# Patient Record
Sex: FEMALE | Race: WHITE | Employment: FULL TIME | URBAN - METROPOLITAN AREA
[De-identification: names, ages, dates, MRNs, and addresses within clinical notes are randomized per-mention and may not be internally consistent; named-entity substitution may affect disease eponyms.]

---

## 2020-08-06 RX ORDER — ETHYNODIOL DIACETATE AND ETHINYL ESTRADIOL 1 MG-50MCG
KIT ORAL
Qty: 4 PACKAGE | Refills: 4 | Status: SHIPPED | OUTPATIENT
Start: 2020-08-06 | End: 2021-08-04 | Stop reason: SDUPTHER

## 2021-01-21 ENCOUNTER — VIRTUAL VISIT (OUTPATIENT)
Dept: PRIMARY CARE CLINIC | Age: 46
End: 2021-01-21
Payer: COMMERCIAL

## 2021-01-21 DIAGNOSIS — J01.01 ACUTE RECURRENT MAXILLARY SINUSITIS: Primary | ICD-10-CM

## 2021-01-21 PROCEDURE — 99213 OFFICE O/P EST LOW 20 MIN: CPT | Performed by: NURSE PRACTITIONER

## 2021-01-21 RX ORDER — MONTELUKAST SODIUM 10 MG/1
10 TABLET ORAL DAILY
COMMUNITY
Start: 2020-11-27 | End: 2021-11-30

## 2021-01-21 RX ORDER — AMOXICILLIN AND CLAVULANATE POTASSIUM 875; 125 MG/1; MG/1
1 TABLET, FILM COATED ORAL EVERY 12 HOURS
Qty: 20 TAB | Refills: 0 | Status: SHIPPED | OUTPATIENT
Start: 2021-01-21 | End: 2021-01-31

## 2021-01-21 RX ORDER — CHOLECALCIFEROL (VITAMIN D3) 125 MCG
CAPSULE ORAL
COMMUNITY
End: 2022-08-09 | Stop reason: ALTCHOICE

## 2021-01-21 NOTE — PROGRESS NOTES
HISTORY OF PRESENT ILLNESS  Dinorah Wells is a 39 y.o. female presents via telemedicine for  Sinus infection. Patient reported that she has been having sinus pain and nasal congestion x2 weeks. Patient reported that she has been having green mucus when she blows her nose. Denies sob, fever, chills, SOB or cough. Patient has been using her singular, zyrtec, flonase with mild relief. Patient notes that she has been having sinus infections often this year (4 sinus infections). There were no vitals filed for this visit. There is no problem list on file for this patient. There are no active problems to display for this patient. Current Outpatient Medications   Medication Sig Dispense Refill    cholecalciferol (VITAMIN D3) (5000 Units /125 mcg) capsule cholecalciferol (vitamin D3) 125 mcg (5,000 unit) capsule      montelukast (SINGULAIR) 10 mg tablet Take 10 mg by mouth daily.  amoxicillin-clavulanate (AUGMENTIN) 875-125 mg per tablet Take 1 Tab by mouth every twelve (12) hours for 10 days. 20 Tab 0    Kelnor 1-50 1-50 mg-mcg tab TAKE 1 ACTIVE TABLET BY MOUTH ONCE DAILY, DISCARD PLACEBOS 4 Package 4     No Known Allergies  Past Medical History:   Diagnosis Date    Allergies      Past Surgical History:   Procedure Laterality Date    HX HYSTERECTOMY       Family History   Problem Relation Age of Onset    High Cholesterol Mother     Diabetes Father     Hypertension Father     Thyroid Disease Sister      Social History     Tobacco Use    Smoking status: Never Smoker    Smokeless tobacco: Never Used   Substance Use Topics    Alcohol use: Never     Frequency: Never           Review of Systems   Constitutional: Negative for chills and fever. HENT: Positive for congestion and sinus pain. Negative for ear pain and sore throat. Respiratory: Negative for cough and shortness of breath. Cardiovascular: Negative for chest pain and palpitations. Gastrointestinal: Negative. Musculoskeletal: Negative for myalgias. Neurological: Negative for dizziness and headaches. Endo/Heme/Allergies: Positive for environmental allergies. Physical Exam  Constitutional:       Appearance: Normal appearance. HENT:      Head: Normocephalic. Eyes:      Conjunctiva/sclera: Conjunctivae normal.   Pulmonary:      Effort: Pulmonary effort is normal.   Skin:     General: Skin is warm and dry. Neurological:      Mental Status: She is alert and oriented to person, place, and time. Psychiatric:         Mood and Affect: Mood normal.         Behavior: Behavior normal.           ASSESSMENT and PLAN  Diagnoses and all orders for this visit:    1. Acute recurrent maxillary sinusitis  Comments:  Recurrent sinus infections, this is the 4th since March 2020. Discussed seeing ENT if they continue to be this frequent. Abx sent in. Supportive measures. Orders:  -     amoxicillin-clavulanate (AUGMENTIN) 875-125 mg per tablet; Take 1 Tab by mouth every twelve (12) hours for 10 days. Bartolo Lundborg, who was evaluated through a synchronous (real-time) audio-video encounter, and/or her healthcare decision maker, is aware that it is a billable service, with coverage as determined by her insurance carrier. She provided verbal consent to proceed: Yes, and patient identification was verified. It was conducted pursuant to the emergency declaration under the [address] [place] [address] authority and the [employer] General Act. A caregiver was present when appropriate. Ability to conduct physical exam was limited. I was at home. The patient was  In a car.          Santiago Simmons NP

## 2021-08-04 ENCOUNTER — OFFICE VISIT (OUTPATIENT)
Dept: OBGYN CLINIC | Age: 46
End: 2021-08-04
Payer: COMMERCIAL

## 2021-08-04 VITALS
OXYGEN SATURATION: 95 % | WEIGHT: 251 LBS | SYSTOLIC BLOOD PRESSURE: 149 MMHG | DIASTOLIC BLOOD PRESSURE: 75 MMHG | HEIGHT: 65 IN | HEART RATE: 69 BPM | BODY MASS INDEX: 41.82 KG/M2

## 2021-08-04 DIAGNOSIS — Z01.419 ROUTINE GYNECOLOGICAL EXAMINATION: Primary | ICD-10-CM

## 2021-08-04 PROCEDURE — 99396 PREV VISIT EST AGE 40-64: CPT | Performed by: OBSTETRICS & GYNECOLOGY

## 2021-08-04 RX ORDER — ETHYNODIOL DIACETATE AND ETHINYL ESTRADIOL 1 MG-50MCG
KIT ORAL
Qty: 4 PACKAGE | Refills: 4 | Status: SHIPPED | OUTPATIENT
Start: 2021-08-04 | End: 2022-08-09 | Stop reason: ALTCHOICE

## 2021-08-04 NOTE — PROGRESS NOTES
HISTORY OF PRESENT ILLNESS  Priscilla Granados is a 55 y.o. female who presents today for the following:  Chief Complaint   Patient presents with    Annual Exam   Is a 59-year-old G1, P3 female who presents today for routine annual exam. She is concerned about weight gain on presentation today. She is currently on Kelnor for menopausal symptoms and wishes to continue.     No Known Allergies    Current Outpatient Medications   Medication Sig    ethynodiol-ethinyl estradiol (Kelnor 1-50, 28,) 1-50 mg-mcg tab TAKE 1 ACTIVE TABLET BY MOUTH ONCE DAILY, DISCARD PLACEBOS    cholecalciferol (VITAMIN D3) (5000 Units /125 mcg) capsule cholecalciferol (vitamin D3) 125 mcg (5,000 unit) capsule    montelukast (SINGULAIR) 10 mg tablet Take 10 mg by mouth daily. (Patient not taking: Reported on 2021)     No current facility-administered medications for this visit.        Past Medical History:   Diagnosis Date    Allergies        Past Surgical History:   Procedure Laterality Date    HX HYSTERECTOMY         Family History   Problem Relation Age of Onset    High Cholesterol Mother     Diabetes Father     Hypertension Father     Thyroid Disease Sister        Social History     Socioeconomic History    Marital status: SINGLE     Spouse name: Not on file    Number of children: Not on file    Years of education: Not on file    Highest education level: Not on file   Occupational History    Not on file   Tobacco Use    Smoking status: Never Smoker    Smokeless tobacco: Never Used   Vaping Use    Vaping Use: Never used   Substance and Sexual Activity    Alcohol use: Not Currently    Drug use: Never    Sexual activity: Yes     Partners: Male     Birth control/protection: Surgical   Other Topics Concern    Not on file   Social History Narrative    Not on file     Social Determinants of Health     Financial Resource Strain:     Difficulty of Paying Living Expenses:    Food Insecurity:     Worried About Running Out of Food in the Last Year:    [address] in the Last Year:    Transportation Needs:     Lack of Transportation (Medical):  Lack of Transportation (Non-Medical):    Physical Activity:     Days of Exercise per Week:     Minutes of Exercise per Session:    Stress:     Feeling of Stress :    Social Connections:     Frequency of Communication with Friends and Family:     Frequency of Social Gatherings with Friends and Family:     Attends [employer] Services:     Active Member of Clubs or Organizations:     Attends Club or Organization Meetings:     Marital Status:    Intimate Partner Violence:     Fear of Current or Ex-Partner:     Emotionally Abused:     Physically Abused:     Sexually Abused:            REVIEW OF SYSTEMS     Constitutional: Negative for chills, fever and malaise/fatigue. HENT: Negative for congestion, hearing loss and sore throat. Respiratory: Negative for cough, sputum production, shortness of breath and wheezing. Cardiovascular: Negative for chest pain. Gastrointestinal: Negative for abdominal pain, constipation, diarrhea, nausea and vomiting. Genitourinary: Negative for dysuria, flank pain, hematuria and urgency. Neurological: Negative for dizziness, loss of consciousness, weakness and headaches. Psychiatric/Behavioral: Negative for depression.      PHYSICAL EXAM  BP (!) 149/75 (BP 1 Location: Left upper arm, BP Patient Position: Sitting, BP Cuff Size: Large adult)   Pulse 69   Ht 5' 5\" (1.651 m)   Wt 251 lb (113.9 kg)   SpO2 95%   BMI 41.77 kg/m²      Patient is a well-developed well-nourished female no apparent distress  She is alert and oriented x3  Head is normocephalic atraumatic pupils equal round react light accommodation  Neck is supple without adenopathy or thyromegaly  Heart is with regular rate and rhythm without murmurs rubs or gallops  Lungs are clear to auscultation and percussion bilaterally  Breasts are without masses bilaterally  Abdomen is soft nontender nondistended bowel sounds are present and active  Extremities are without clubbing cyanosis or edema  Pulses are full and symmetric bilaterally  Pelvic  External genitalia within normal limits  Urethra is midline there are no apparent urethral lesions the bladder is within normal limits  Vagina is with normal rugae there is minimal discharge present in the vaginal vault  Cervix is parous, there are no apparent cervical lesions, there is no cervical motion tenderness  Uterus is normal size and contours  Adnexa are without masses    ASSESSMENT and PLAN  Normal annual exam, discussed diet and exercise for weight loss  Plan: Mammogram done, hormone replacement therapy refilled  No results found for this visit on 08/04/21.     Orders Placed This Encounter    ethynodiol-ethinyl estradiol (Kelnor 1-50, 28,) 1-50 mg-mcg tab     Sig: TAKE 1 ACTIVE TABLET BY MOUTH ONCE DAILY, DISCARD PLACEBOS     Dispense:  4 Package     Refill:  4

## 2021-08-04 NOTE — PROGRESS NOTES
Chief Complaint   Patient presents with    Annual Exam     Visit Vitals  BP (!) 149/75 (BP 1 Location: Left upper arm, BP Patient Position: Sitting, BP Cuff Size: Large adult)   Pulse 69   Ht 5' 5\" (1.651 m)   Wt 251 lb (113.9 kg)   SpO2 95%   BMI 41.77 kg/m²

## 2021-11-26 ENCOUNTER — NURSE TRIAGE (OUTPATIENT)
Dept: OTHER | Facility: CLINIC | Age: 46
End: 2021-11-26

## 2021-11-26 NOTE — TELEPHONE ENCOUNTER
Received call from Nat Sequeira at [facility] with Red Flag Complaint. Brief description of triage: dizziness, light headed. AT times room spins. Triage indicates for patient to pcp in 24 hours, [facility] if unable to schedule    Care advice provided, patient verbalizes understanding; denies any other questions or concerns; instructed to call back for any new or worsening symptoms. Writer provided warm transfer to [facility] at [facility] for appointment scheduling. Attention Provider: Thank you for allowing me to participate in the care of your patient. The patient was connected to triage in response to information provided to the [employer]. Please do not respond through this encounter as the response is not directed to a shared pool. Reason for Disposition   [1] MODERATE dizziness (e.g., vertigo; feels very unsteady, interferes with normal activities) AND [2] has NOT been evaluated by physician for this    Answer Assessment - Initial Assessment Questions  1. DESCRIPTION: \"Describe your dizziness. \"      Room spinning    2. VERTIGO: \"Do you feel like either you or the room is spinning or tilting? \"       Yes    3. LIGHTHEADED: \"Do you feel lightheaded? \" (e.g., somewhat faint, woozy, weak upon standing)      Woozy    4. SEVERITY: \"How bad is it? \"  \"Can you walk? \"    - MILD - Feels unsteady but walking normally. - MODERATE - Feels very unsteady when walking, but not falling; interferes with normal activities (e.g., school, work) . - SEVERE - Unable to walk without falling (requires assistance). Moderate    5. ONSET:  \"When did the dizziness begin? \"      Started last week, worse this week    6. AGGRAVATING FACTORS: \"Does anything make it worse? \" (e.g., standing, change in head position)      Movement    7. CAUSE: \"What do you think is causing the dizziness? \"      Unsure    8. RECURRENT SYMPTOM: \"Have you had dizziness before? \" If so, ask: \"When was the last time? \" \"What happened that time? \" Denies    9. OTHER SYMPTOMS: \"Do you have any other symptoms? \" (e.g., headache, weakness, numbness, vomiting, earache)      Nausea,    10. PREGNANCY: \"Is there any chance you are pregnant? \" \"When was your last menstrual period? \"        denies    Protocols used: DIZZINESS - VERTIGO-ADULT-AH

## 2021-11-30 ENCOUNTER — OFFICE VISIT (OUTPATIENT)
Dept: PRIMARY CARE CLINIC | Age: 46
End: 2021-11-30
Payer: COMMERCIAL

## 2021-11-30 VITALS
RESPIRATION RATE: 18 BRPM | HEIGHT: 65 IN | DIASTOLIC BLOOD PRESSURE: 84 MMHG | SYSTOLIC BLOOD PRESSURE: 144 MMHG | BODY MASS INDEX: 38.82 KG/M2 | TEMPERATURE: 97.9 F | WEIGHT: 233 LBS | HEART RATE: 77 BPM | OXYGEN SATURATION: 96 %

## 2021-11-30 DIAGNOSIS — Z23 ENCOUNTER FOR IMMUNIZATION: ICD-10-CM

## 2021-11-30 DIAGNOSIS — R42 VERTIGO: Primary | ICD-10-CM

## 2021-11-30 DIAGNOSIS — R03.0 ELEVATED BP WITHOUT DIAGNOSIS OF HYPERTENSION: ICD-10-CM

## 2021-11-30 DIAGNOSIS — E78.2 MIXED HYPERLIPIDEMIA: ICD-10-CM

## 2021-11-30 PROCEDURE — 90471 IMMUNIZATION ADMIN: CPT | Performed by: NURSE PRACTITIONER

## 2021-11-30 PROCEDURE — 99214 OFFICE O/P EST MOD 30 MIN: CPT | Performed by: NURSE PRACTITIONER

## 2021-11-30 PROCEDURE — 90756 CCIIV4 VACC ABX FREE IM: CPT | Performed by: FAMILY MEDICINE

## 2021-11-30 RX ORDER — MECLIZINE HYDROCHLORIDE 25 MG/1
25 TABLET ORAL
Qty: 30 TABLET | Refills: 0
Start: 2021-11-30 | End: 2021-12-10

## 2021-11-30 NOTE — LETTER
NOTIFICATION RETURN TO WORK / SCHOOL    11/30/2021 10:07 AM    Ms. Valerio Chappell  [facility] [address]      To Whom It May Concern: Valerio Chappell is currently under the care of Morenita Cherry. She will return to work/school on:12/6/21    If there are questions or concerns please have the patient contact our office.         Sincerely,      Ita Herbert NP

## 2021-11-30 NOTE — PROGRESS NOTES
HISTORY OF PRESENT ILLNESS  Anisa Weldon is a 55 y.o. female presents for vertigo. Patient reported that she has been having dizziness that feels like the room is spinning. Gets associated nasuea with the dizziness. This has been occurring for 2 weeks. Patient reported that [facility] and was dx with vertigo, given meclizine which helps with the symptoms. 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 Denies chest pain or palpitations. Patient is also asking for labs to check on wellness. Is fasting today. Hx of elevated cholesterol levels which is controlled by diet currently. Vitals:    11/30/21 0917 11/30/21 1005   BP: (!) 166/91 (!) 144/84   Pulse: 77    Resp: 18    Temp: 97.9 °F (36.6 °C)    TempSrc: Temporal    SpO2: 96%    Weight: 233 lb (105.7 kg)    Height: 5' 5\" (1.651 m)      Patient Active Problem List   Diagnosis Code    Vertigo R42     Patient Active Problem List    Diagnosis Date Noted    Vertigo      Current Outpatient Medications   Medication Sig Dispense Refill    meclizine (ANTIVERT) 25 mg tablet Take 1 Tablet by mouth three (3) times daily as needed for Dizziness for up to 10 days.  30 Tablet 0    ethynodiol-ethinyl estradiol (Kelnor 1-50, 28,) 1-50 mg-mcg tab TAKE 1 ACTIVE TABLET BY MOUTH ONCE DAILY, DISCARD PLACEBOS 4 Package 4    cholecalciferol (VITAMIN D3) (5000 Units /125 mcg) capsule cholecalciferol (vitamin D3) 125 mcg (5,000 unit) capsule       No Known Allergies  Past Medical History:   Diagnosis Date    Allergies     Vertigo      Past Surgical History:   Procedure Laterality Date    HX HYSTERECTOMY       Family History   Problem Relation Age of Onset    High Cholesterol Mother     Diabetes Father     Hypertension Father     Thyroid Disease Sister      Social History     Tobacco Use    Smoking status: Never Smoker    Smokeless tobacco: Never Used   Substance Use Topics    Alcohol use: Not Currently           Review of Systems   Constitutional: Negative for malaise/fatigue and weight loss. Respiratory: Negative for cough and shortness of breath. Cardiovascular: Negative for chest pain and palpitations. Musculoskeletal: Negative for joint pain and myalgias. Neurological: Positive for dizziness. Negative for headaches. Physical Exam  Constitutional:       Appearance: Normal appearance. She is normal weight. HENT:      Head: Normocephalic. Eyes:      Extraocular Movements: Extraocular movements intact. Conjunctiva/sclera: Conjunctivae normal.      Pupils: Pupils are equal, round, and reactive to light. Cardiovascular:      Rate and Rhythm: Normal rate and regular rhythm. Pulses: Normal pulses. Heart sounds: Normal heart sounds. Pulmonary:      Effort: Pulmonary effort is normal.      Breath sounds: Normal breath sounds. Musculoskeletal:         General: Normal range of motion. Cervical back: Normal range of motion and neck supple. Skin:     General: Skin is warm and dry. Neurological:      General: No focal deficit present. Mental Status: She is alert and oriented to person, place, and time. Psychiatric:         Mood and Affect: Mood normal.           ASSESSMENT and PLAN  Diagnoses and all orders for this visit:    1. Vertigo  Comments:  continue meclizine. Try video exercises to help with vertigo, if no improving will send to ENT. Orders:  -     CBC WITH AUTOMATED DIFF  -     TSH 3RD GENERATION  -     meclizine (ANTIVERT) 25 mg tablet; Take 1 Tablet by mouth three (3) times daily as needed for Dizziness for up to 10 days. 2. Mixed hyperlipidemia  Comments:  diet controlled in past.   Orders:  -     METABOLIC PANEL, COMPREHENSIVE  -     LIPID PANEL    3. Encounter for immunization  -     INFLUENZA VACCINE (CCIIV4 VACCINE ANTIBIO FREE 0.5 ML)  -     LA IMMUNIZ ADMIN,1 SINGLE/COMB VAC/TOXOID    4. Elevated BP without diagnosis of hypertension  Comments:  will continue to monitor, patient believes it is elevated because she does not feel well.   Discussed lifestyle changes to reduce BP today         Christiano Hull, NP

## 2021-11-30 NOTE — PROGRESS NOTES
Chief Complaint   Patient presents with    Dizziness     Pt states having dizzy speel and N/V for the last couple of weeks. Pt states having headaches. [facility] and [facility] her with [facility]igo. pt is asking for bw and flu shot      1. Have you been to the ER, urgent care clinic since your last visit? Hospitalized since your last visit? [facility]     2. Have you seen or consulted any other health care providers outside of the [address] [place] [address] since your last visit? Include any pap smears or colon screening.  No  Visit Vitals  BP (!) 166/91 (BP 1 Location: Right arm, BP Patient Position: At rest, BP Cuff Size: Adult)   Pulse 77   Temp 97.9 °F (36.6 °C) (Temporal)   Resp 18   Ht 5' 5\" (1.651 m)   Wt 233 lb (105.7 kg)   SpO2 96%   BMI 38.77 kg/m²

## 2021-12-01 LAB
ALBUMIN SERPL-MCNC: 4.3 G/DL (ref 3.8–4.8)
ALBUMIN/GLOB SERPL: 1.7 {RATIO} (ref 1.2–2.2)
ALP SERPL-CCNC: 57 IU/L (ref 44–121)
ALT SERPL-CCNC: 9 IU/L (ref 0–32)
AST SERPL-CCNC: 10 IU/L (ref 0–40)
BASOPHILS # BLD AUTO: 0.1 X10E3/UL (ref 0–0.2)
BASOPHILS NFR BLD AUTO: 1 %
BILIRUB SERPL-MCNC: <0.2 MG/DL (ref 0–1.2)
BUN SERPL-MCNC: 17 MG/DL (ref 6–24)
BUN/CREAT SERPL: 19 (ref 9–23)
CALCIUM SERPL-MCNC: 9.9 MG/DL (ref 8.7–10.2)
CHLORIDE SERPL-SCNC: 98 MMOL/L (ref 96–106)
CHOLEST SERPL-MCNC: 266 MG/DL (ref 100–199)
CO2 SERPL-SCNC: 24 MMOL/L (ref 20–29)
CREAT SERPL-MCNC: 0.88 MG/DL (ref 0.57–1)
EOSINOPHIL # BLD AUTO: 0.4 X10E3/UL (ref 0–0.4)
EOSINOPHIL NFR BLD AUTO: 3 %
ERYTHROCYTE [DISTWIDTH] IN BLOOD BY AUTOMATED COUNT: 12 % (ref 11.7–15.4)
GLOBULIN SER CALC-MCNC: 2.5 G/DL (ref 1.5–4.5)
GLUCOSE SERPL-MCNC: 91 MG/DL (ref 65–99)
HCT VFR BLD AUTO: 44 % (ref 34–46.6)
HDLC SERPL-MCNC: 70 MG/DL
HGB BLD-MCNC: 14.7 G/DL (ref 11.1–15.9)
IMM GRANULOCYTES # BLD AUTO: 0 X10E3/UL (ref 0–0.1)
IMM GRANULOCYTES NFR BLD AUTO: 0 %
LDLC SERPL CALC-MCNC: 158 MG/DL (ref 0–99)
LYMPHOCYTES # BLD AUTO: 3.3 X10E3/UL (ref 0.7–3.1)
LYMPHOCYTES NFR BLD AUTO: 23 %
MCH RBC QN AUTO: 28.5 PG (ref 26.6–33)
MCHC RBC AUTO-ENTMCNC: 33.4 G/DL (ref 31.5–35.7)
MCV RBC AUTO: 85 FL (ref 79–97)
MONOCYTES # BLD AUTO: 0.9 X10E3/UL (ref 0.1–0.9)
MONOCYTES NFR BLD AUTO: 6 %
NEUTROPHILS # BLD AUTO: 9.7 X10E3/UL (ref 1.4–7)
NEUTROPHILS NFR BLD AUTO: 67 %
PLATELET # BLD AUTO: 322 X10E3/UL (ref 150–450)
POTASSIUM SERPL-SCNC: 4.9 MMOL/L (ref 3.5–5.2)
PROT SERPL-MCNC: 6.8 G/DL (ref 6–8.5)
RBC # BLD AUTO: 5.15 X10E6/UL (ref 3.77–5.28)
SODIUM SERPL-SCNC: 138 MMOL/L (ref 134–144)
TRIGL SERPL-MCNC: 208 MG/DL (ref 0–149)
TSH SERPL DL<=0.005 MIU/L-ACNC: 2.59 UIU/ML (ref 0.45–4.5)
VLDLC SERPL CALC-MCNC: 38 MG/DL (ref 5–40)
WBC # BLD AUTO: 14.4 X10E3/UL (ref 3.4–10.8)

## 2021-12-01 NOTE — PROGRESS NOTES
Notified patient through portal regarding lab results.      The 10-year ASCVD risk score (Zayda Manning et al., 2013) is: 1.2%    Values used to calculate the score:      Age: 55 years      Sex: Female      Is Non- : No      Diabetic: No      Tobacco smoker: No      Systolic Blood Pressure: 116 mmHg      Is BP treated: No      HDL Cholesterol: 70 mg/dL      Total Cholesterol: 266 mg/dL

## 2022-03-21 ENCOUNTER — OFFICE VISIT (OUTPATIENT)
Dept: PRIMARY CARE CLINIC | Age: 47
End: 2022-03-21
Payer: COMMERCIAL

## 2022-03-21 VITALS — HEART RATE: 75 BPM | TEMPERATURE: 97.3 F | OXYGEN SATURATION: 99 %

## 2022-03-21 DIAGNOSIS — J30.89 ENVIRONMENTAL AND SEASONAL ALLERGIES: Primary | ICD-10-CM

## 2022-03-21 PROCEDURE — 99213 OFFICE O/P EST LOW 20 MIN: CPT | Performed by: NURSE PRACTITIONER

## 2022-03-21 RX ORDER — ALBUTEROL SULFATE 90 UG/1
2 AEROSOL, METERED RESPIRATORY (INHALATION)
Qty: 6.7 G | Refills: 0 | Status: SHIPPED | OUTPATIENT
Start: 2022-03-21 | End: 2022-04-08

## 2022-03-21 NOTE — PROGRESS NOTES
HISTORY OF PRESENT ILLNESS  Bairon Hicks is a 55 y.o. female presents for allergy concern. Patient reported she developed nasal congestion, sinus pain, watery eyes and dry cough x3 days ago. Patient was outside working in the yard prior to symptoms starting. Denies fevers or GI symptoms. She did start allergra and flonase x2 days ago, has not seen much improvement in symptoms. Concern for developing bronchitis as historically she has allergy induced bronchitis. Vitals:    03/21/22 1123   Pulse: 75   Temp: 97.3 °F (36.3 °C)   TempSrc: Temporal   SpO2: 99%     Patient Active Problem List   Diagnosis Code    Vertigo R42     Patient Active Problem List    Diagnosis Date Noted    Vertigo      Current Outpatient Medications   Medication Sig Dispense Refill    albuterol (PROVENTIL HFA, VENTOLIN HFA, PROAIR HFA) 90 mcg/actuation inhaler Take 2 Puffs by inhalation every four (4) hours as needed for Wheezing. 6.7 g 0    ethynodiol-ethinyl estradiol (Kelnor 1-50, 28,) 1-50 mg-mcg tab TAKE 1 ACTIVE TABLET BY MOUTH ONCE DAILY, DISCARD PLACEBOS 4 Package 4    cholecalciferol (VITAMIN D3) (5000 Units /125 mcg) capsule cholecalciferol (vitamin D3) 125 mcg (5,000 unit) capsule       No Known Allergies  Past Medical History:   Diagnosis Date    Allergies     Vertigo      Past Surgical History:   Procedure Laterality Date    HX HYSTERECTOMY       Family History   Problem Relation Age of Onset    High Cholesterol Mother     Diabetes Father     Hypertension Father     Thyroid Disease Sister      Social History     Tobacco Use    Smoking status: Never Smoker    Smokeless tobacco: Never Used   Substance Use Topics    Alcohol use: Not Currently           Review of Systems   Constitutional: Negative for chills, fever and malaise/fatigue. HENT: Positive for congestion, sinus pain and sore throat. Negative for ear discharge, ear pain and tinnitus. Respiratory: Positive for cough.  Negative for sputum production, shortness of breath and wheezing. Cardiovascular: Negative for chest pain and palpitations. Gastrointestinal: Negative. Musculoskeletal: Negative for joint pain and myalgias. Neurological: Negative for dizziness and headaches. Physical Exam  Constitutional:       Appearance: Normal appearance. HENT:      Head: Normocephalic. Right Ear: Tympanic membrane, ear canal and external ear normal.      Left Ear: Tympanic membrane, ear canal and external ear normal.      Nose: Nose normal.      Mouth/Throat:      Mouth: Mucous membranes are moist.      Pharynx: Oropharynx is clear. Comments: cobblestoning    Eyes:      Conjunctiva/sclera: Conjunctivae normal.   Cardiovascular:      Rate and Rhythm: Normal rate and regular rhythm. Pulses: Normal pulses. Heart sounds: Normal heart sounds. Pulmonary:      Effort: Pulmonary effort is normal.   Skin:     General: Skin is warm and dry. Neurological:      Mental Status: She is alert and oriented to person, place, and time. Psychiatric:         Mood and Affect: Mood normal.         Behavior: Behavior normal.           ASSESSMENT and PLAN  Diagnoses and all orders for this visit:    1. Environmental and seasonal allergies  Comments:  continue allegra and flonase. Sending in albuterol to use if needed. Orders:  -     albuterol (PROVENTIL HFA, VENTOLIN HFA, PROAIR HFA) 90 mcg/actuation inhaler; Take 2 Puffs by inhalation every four (4) hours as needed for Wheezing.          Coni Valadez NP

## 2022-03-21 NOTE — LETTER
NOTIFICATION RETURN TO WORK / SCHOOL    3/21/2022 11:53 AM    Ms. Marietta Vargas  [facility] [address]      To Whom It May Concern: Marietta Vargas is currently under the care of Morenita Cherry. She will return to work/school on: 3/23/22    If there are questions or concerns please have the patient contact our office.         Sincerely,      Jose Martin NP

## 2022-03-21 NOTE — PROGRESS NOTES
Chief Complaint   Patient presents with    Sinus Infection     Pt states having cough, congestion, sinus pressure for a few days. Pt states she has been taking allergra and flonase. Pt states she was outside working in the yard       1. Have you been to the ER, urgent care clinic since your last visit? Hospitalized since your last visit?no    2. Have you seen or consulted any other health care providers outside of the [address] [place] [address] since your last visit? Include any pap smears or colon screening.  no    Visit Vitals  Pulse 75   Temp 97.3 °F (36.3 °C) (Temporal)   SpO2 99%

## 2022-04-08 DIAGNOSIS — J30.89 ENVIRONMENTAL AND SEASONAL ALLERGIES: ICD-10-CM

## 2022-04-08 RX ORDER — ALBUTEROL SULFATE 90 UG/1
AEROSOL, METERED RESPIRATORY (INHALATION)
Qty: 6.7 EACH | Refills: 2 | Status: SHIPPED | OUTPATIENT
Start: 2022-04-08 | End: 2022-08-09 | Stop reason: ALTCHOICE

## 2022-08-09 ENCOUNTER — OFFICE VISIT (OUTPATIENT)
Dept: OBGYN CLINIC | Age: 47
End: 2022-08-09
Payer: COMMERCIAL

## 2022-08-09 VITALS — BODY MASS INDEX: 41.09 KG/M2 | WEIGHT: 246.9 LBS | SYSTOLIC BLOOD PRESSURE: 148 MMHG | DIASTOLIC BLOOD PRESSURE: 85 MMHG

## 2022-08-09 DIAGNOSIS — Z01.419 ROUTINE GYNECOLOGICAL EXAMINATION: Primary | ICD-10-CM

## 2022-08-09 PROCEDURE — 99396 PREV VISIT EST AGE 40-64: CPT | Performed by: OBSTETRICS & GYNECOLOGY

## 2022-08-09 RX ORDER — ESTRADIOL 2 MG/1
2 TABLET ORAL DAILY
Qty: 90 TABLET | Refills: 3 | Status: SHIPPED | OUTPATIENT
Start: 2022-08-09

## 2022-08-09 NOTE — PROGRESS NOTES
HISTORY OF PRESENT ILLNESS  Waldo Bruner is a 52 y.o. female who presents today for the following:  Chief Complaint   Patient presents with    Annual Exam   57-year-old  female who presents today for routine annual exam.  She is without complaints on presentation today other than noting she has had a lot of sweating and feeling hot on her current OCPs. She is undergone complete hysterectomy and was given this medication for menopausal symptoms.     No Known Allergies    No current outpatient medications on file. No current facility-administered medications for this visit.        Past Medical History:   Diagnosis Date    Allergies     Vertigo        Past Surgical History:   Procedure Laterality Date    HX HYSTERECTOMY         Family History   Problem Relation Age of Onset    High Cholesterol Mother     Thyroid Disease Mother     Diabetes Father     Hypertension Father     Thyroid Disease Sister        Social History     Socioeconomic History    Marital status: SINGLE     Spouse name: Not on file    Number of children: Not on file    Years of education: Not on file    Highest education level: Not on file   Occupational History    Not on file   Tobacco Use    Smoking status: Light Smoker    Smokeless tobacco: Never   Vaping Use    Vaping Use: Never used   Substance and Sexual Activity    Alcohol use: Not Currently    Drug use: Never    Sexual activity: Yes     Partners: Male     Birth control/protection: Surgical, None     Comment: Hysterectomy   Other Topics Concern    Not on file   Social History Narrative    Not on file     Social Determinants of Health     Financial Resource Strain: Not on file   Food Insecurity: Not on file   Transportation Needs: Not on file   Physical Activity: Not on file   Stress: Not on file   Social Connections: Not on file   Intimate Partner Violence: Not on file   Housing Stability: Not on file           REVIEW OF SYSTEMS     Constitutional: Negative for chills, fever and malaise/fatigue. HENT: Negative for congestion, hearing loss and sore throat. Respiratory: Negative for cough, sputum production, shortness of breath and wheezing. Cardiovascular: Negative for chest pain. Gastrointestinal: Negative for abdominal pain, constipation, diarrhea, nausea and vomiting. Genitourinary: Negative for dysuria, flank pain, hematuria and urgency. Neurological: Negative for dizziness, loss of consciousness, weakness and headaches. Psychiatric/Behavioral: Negative for depression. PHYSICAL EXAM  BP (!) 148/85 (BP 1 Location: Right upper arm, BP Patient Position: Sitting, BP Cuff Size: Large adult)   Wt 246 lb 14.4 oz (112 kg)   BMI 41.09 kg/m²      Patient is a well-developed well-nourished female no apparent distress  She is alert and oriented x3  Head is normocephalic atraumatic pupils equal round react light accommodation  Neck is supple without adenopathy or thyromegaly  Heart is with regular rate and rhythm without murmurs rubs or gallops  Lungs are clear to auscultation and percussion bilaterally  Breasts are without masses bilaterally  Abdomen is soft nontender nondistended bowel sounds are present and active  Extremities are without clubbing cyanosis or edema  Pulses are full and symmetric bilaterally  Pelvic  External genitalia within normal limits  Urethra is midline there are no apparent urethral lesions the bladder is within normal limits  Vagina is with normal rugae there is minimal discharge present in the vaginal vault  Cervix is surgically absent  Uterus is surgically absent  Adnexa are without masses    ASSESSMENT and PLAN  Normal annual exam  Plan: HRT converted to estradiol only, mammogram done, follow-up as needed and yearly  No results found for this visit on 08/09/22. No orders of the defined types were placed in this encounter.

## 2022-09-20 RX ORDER — ETHYNODIOL DIACETATE AND ETHINYL ESTRADIOL 1 MG-50MCG
KIT ORAL
Refills: 4 | OUTPATIENT
Start: 2022-09-20

## 2023-07-31 ENCOUNTER — TELEPHONE (OUTPATIENT)
Age: 48
End: 2023-07-31

## 2023-07-31 ENCOUNTER — TELEPHONE (OUTPATIENT)
Facility: CLINIC | Age: 48
End: 2023-07-31

## 2023-07-31 ENCOUNTER — OFFICE VISIT (OUTPATIENT)
Facility: CLINIC | Age: 48
End: 2023-07-31
Payer: COMMERCIAL

## 2023-07-31 VITALS
RESPIRATION RATE: 16 BRPM | BODY MASS INDEX: 44.05 KG/M2 | TEMPERATURE: 97.7 F | WEIGHT: 258 LBS | HEIGHT: 64 IN | OXYGEN SATURATION: 97 % | DIASTOLIC BLOOD PRESSURE: 82 MMHG | SYSTOLIC BLOOD PRESSURE: 110 MMHG | HEART RATE: 68 BPM

## 2023-07-31 DIAGNOSIS — Z13.29 THYROID DISORDER SCREENING: ICD-10-CM

## 2023-07-31 DIAGNOSIS — K21.9 GASTROESOPHAGEAL REFLUX DISEASE WITHOUT ESOPHAGITIS: ICD-10-CM

## 2023-07-31 DIAGNOSIS — E66.01 CLASS 3 SEVERE OBESITY DUE TO EXCESS CALORIES WITH SERIOUS COMORBIDITY AND BODY MASS INDEX (BMI) OF 40.0 TO 44.9 IN ADULT (HCC): ICD-10-CM

## 2023-07-31 DIAGNOSIS — N95.1 SYMPTOMATIC MENOPAUSAL OR FEMALE CLIMACTERIC STATES: Primary | ICD-10-CM

## 2023-07-31 DIAGNOSIS — Z13.220 SCREENING CHOLESTEROL LEVEL: ICD-10-CM

## 2023-07-31 DIAGNOSIS — Z13.228 ENCOUNTER FOR SCREENING FOR OTHER METABOLIC DISORDERS: ICD-10-CM

## 2023-07-31 DIAGNOSIS — Z11.59 ENCOUNTER FOR HEPATITIS C SCREENING TEST FOR LOW RISK PATIENT: ICD-10-CM

## 2023-07-31 DIAGNOSIS — Z12.11 COLON CANCER SCREENING: ICD-10-CM

## 2023-07-31 DIAGNOSIS — Z83.3 FAMILY HISTORY OF DIABETES MELLITUS: ICD-10-CM

## 2023-07-31 DIAGNOSIS — Z00.00 ENCOUNTER FOR WELLNESS EXAMINATION IN ADULT: Primary | ICD-10-CM

## 2023-07-31 DIAGNOSIS — Z13.0 SCREENING FOR DEFICIENCY ANEMIA: ICD-10-CM

## 2023-07-31 DIAGNOSIS — Z11.4 ENCOUNTER FOR SCREENING FOR HIV: ICD-10-CM

## 2023-07-31 PROBLEM — E66.813 CLASS 3 SEVERE OBESITY DUE TO EXCESS CALORIES WITH SERIOUS COMORBIDITY IN ADULT: Status: ACTIVE | Noted: 2023-07-31

## 2023-07-31 PROCEDURE — 99396 PREV VISIT EST AGE 40-64: CPT | Performed by: NURSE PRACTITIONER

## 2023-07-31 RX ORDER — ESTRADIOL 2 MG/1
2 TABLET ORAL DAILY
Qty: 90 TABLET | Refills: 0 | Status: SHIPPED | OUTPATIENT
Start: 2023-07-31

## 2023-07-31 RX ORDER — SEMAGLUTIDE 0.25 MG/.5ML
0.25 INJECTION, SOLUTION SUBCUTANEOUS
Qty: 2 ML | Refills: 0 | Status: SHIPPED | OUTPATIENT
Start: 2023-07-31 | End: 2023-08-04 | Stop reason: ALTCHOICE

## 2023-07-31 SDOH — ECONOMIC STABILITY: FOOD INSECURITY: WITHIN THE PAST 12 MONTHS, THE FOOD YOU BOUGHT JUST DIDN'T LAST AND YOU DIDN'T HAVE MONEY TO GET MORE.: NEVER TRUE

## 2023-07-31 SDOH — ECONOMIC STABILITY: FOOD INSECURITY: WITHIN THE PAST 12 MONTHS, YOU WORRIED THAT YOUR FOOD WOULD RUN OUT BEFORE YOU GOT MONEY TO BUY MORE.: NEVER TRUE

## 2023-07-31 SDOH — ECONOMIC STABILITY: INCOME INSECURITY: HOW HARD IS IT FOR YOU TO PAY FOR THE VERY BASICS LIKE FOOD, HOUSING, MEDICAL CARE, AND HEATING?: NOT HARD AT ALL

## 2023-07-31 SDOH — ECONOMIC STABILITY: HOUSING INSECURITY
IN THE LAST 12 MONTHS, WAS THERE A TIME WHEN YOU DID NOT HAVE A STEADY PLACE TO SLEEP OR SLEPT IN A SHELTER (INCLUDING NOW)?: NO

## 2023-07-31 ASSESSMENT — PATIENT HEALTH QUESTIONNAIRE - PHQ9
SUM OF ALL RESPONSES TO PHQ9 QUESTIONS 1 & 2: 0
SUM OF ALL RESPONSES TO PHQ QUESTIONS 1-9: 0
2. FEELING DOWN, DEPRESSED OR HOPELESS: 0
SUM OF ALL RESPONSES TO PHQ QUESTIONS 1-9: 0
1. LITTLE INTEREST OR PLEASURE IN DOING THINGS: 0
SUM OF ALL RESPONSES TO PHQ QUESTIONS 1-9: 0
SUM OF ALL RESPONSES TO PHQ QUESTIONS 1-9: 0

## 2023-07-31 NOTE — TELEPHONE ENCOUNTER
Patient needs a refill on her medication she is almost out. Pharmacy is [facility] on [address] in [place]  [phone].  Patient phone number has been verified [phone]

## 2023-07-31 NOTE — TELEPHONE ENCOUNTER
----- Message from Narda Carranza sent at 7/31/2023 11:26 AM EDT -----  Subject: Message to Provider    QUESTIONS  Information for Provider? pt called in regards to speak with someone in   regards of her weight loss medication needing pre-authorization . Pt is   asking for the nurse to reach out to her in this concern . Please contact   pt in regards of the pre-authorization   ---------------------------------------------------------------------------  --------------  Tre Marine Angel Luis  [phone]; OK to leave message on voicemail  ---------------------------------------------------------------------------  --------------  SCRIPT ANSWERS  Relationship to Patient?  Self

## 2023-08-01 LAB
ALBUMIN SERPL-MCNC: 4 G/DL (ref 3.9–4.9)
ALBUMIN/GLOB SERPL: 1.4 {RATIO} (ref 1.2–2.2)
ALP SERPL-CCNC: 74 IU/L (ref 44–121)
ALT SERPL-CCNC: 20 IU/L (ref 0–32)
AST SERPL-CCNC: 16 IU/L (ref 0–40)
BASOPHILS # BLD AUTO: 0.1 X10E3/UL (ref 0–0.2)
BASOPHILS NFR BLD AUTO: 1 %
BILIRUB SERPL-MCNC: 0.3 MG/DL (ref 0–1.2)
BUN SERPL-MCNC: 17 MG/DL (ref 6–24)
BUN/CREAT SERPL: 22 (ref 9–23)
CALCIUM SERPL-MCNC: 10.1 MG/DL (ref 8.7–10.2)
CHLORIDE SERPL-SCNC: 101 MMOL/L (ref 96–106)
CHOLEST SERPL-MCNC: 285 MG/DL (ref 100–199)
CO2 SERPL-SCNC: 25 MMOL/L (ref 20–29)
CREAT SERPL-MCNC: 0.77 MG/DL (ref 0.57–1)
EGFRCR SERPLBLD CKD-EPI 2021: 95 ML/MIN/1.73
EOSINOPHIL # BLD AUTO: 0.3 X10E3/UL (ref 0–0.4)
EOSINOPHIL NFR BLD AUTO: 3 %
ERYTHROCYTE [DISTWIDTH] IN BLOOD BY AUTOMATED COUNT: 12.4 % (ref 11.7–15.4)
GLOBULIN SER CALC-MCNC: 2.9 G/DL (ref 1.5–4.5)
GLUCOSE SERPL-MCNC: 83 MG/DL (ref 70–99)
HBA1C MFR BLD: 5.5 % (ref 4.8–5.6)
HCT VFR BLD AUTO: 46.1 % (ref 34–46.6)
HCV AB SERPL QL IA: NORMAL
HCV IGG SERPL QL IA: NON REACTIVE
HDLC SERPL-MCNC: 90 MG/DL
HGB BLD-MCNC: 15.2 G/DL (ref 11.1–15.9)
HIV 1+2 AB+HIV1 P24 AG SERPL QL IA: NON REACTIVE
IMM GRANULOCYTES # BLD AUTO: 0 X10E3/UL (ref 0–0.1)
IMM GRANULOCYTES NFR BLD AUTO: 0 %
LDLC SERPL CALC-MCNC: 165 MG/DL (ref 0–99)
LYMPHOCYTES # BLD AUTO: 2.9 X10E3/UL (ref 0.7–3.1)
LYMPHOCYTES NFR BLD AUTO: 31 %
MCH RBC QN AUTO: 28.6 PG (ref 26.6–33)
MCHC RBC AUTO-ENTMCNC: 33 G/DL (ref 31.5–35.7)
MCV RBC AUTO: 87 FL (ref 79–97)
MONOCYTES # BLD AUTO: 0.7 X10E3/UL (ref 0.1–0.9)
MONOCYTES NFR BLD AUTO: 8 %
NEUTROPHILS # BLD AUTO: 5.3 X10E3/UL (ref 1.4–7)
NEUTROPHILS NFR BLD AUTO: 57 %
PLATELET # BLD AUTO: 300 X10E3/UL (ref 150–450)
POTASSIUM SERPL-SCNC: 4.2 MMOL/L (ref 3.5–5.2)
PROT SERPL-MCNC: 6.9 G/DL (ref 6–8.5)
RBC # BLD AUTO: 5.31 X10E6/UL (ref 3.77–5.28)
SODIUM SERPL-SCNC: 140 MMOL/L (ref 134–144)
TRIGL SERPL-MCNC: 170 MG/DL (ref 0–149)
TSH SERPL DL<=0.005 MIU/L-ACNC: 2.58 UIU/ML (ref 0.45–4.5)
VLDLC SERPL CALC-MCNC: 30 MG/DL (ref 5–40)
WBC # BLD AUTO: 9.2 X10E3/UL (ref 3.4–10.8)

## 2023-08-04 RX ORDER — SEMAGLUTIDE 1 MG/.5ML
1 INJECTION, SOLUTION SUBCUTANEOUS
Qty: 2 ML | Refills: 0 | Status: SHIPPED | OUTPATIENT
Start: 2023-08-04

## 2023-08-21 ENCOUNTER — PATIENT MESSAGE (OUTPATIENT)
Facility: CLINIC | Age: 48
End: 2023-08-21

## 2023-08-21 NOTE — TELEPHONE ENCOUNTER
Good afternoon. I wanted to touch base with you regarding the [facility]. I have talked with [facility] rep and unfortunately, I was not able to get a coupon but you may be able to get the medication refilled sooner. How are you feeling on it? Any side effects? Please give me a call with updates on how you are feeling.

## 2023-08-22 ENCOUNTER — TELEPHONE (OUTPATIENT)
Facility: CLINIC | Age: 48
End: 2023-08-22

## 2023-08-22 RX ORDER — ONDANSETRON 4 MG/1
TABLET, FILM COATED ORAL
Qty: 30 TABLET | Refills: 0 | Status: SHIPPED | OUTPATIENT
Start: 2023-08-22

## 2023-08-22 NOTE — TELEPHONE ENCOUNTER
----- Message from MICHELE Barton sent at 8/22/2023 12:01 PM EDT -----  Regarding: \[facility] [place] [facility]\""  Contact: [phone]  It says it needs presciber referral on it

## 2023-08-28 RX ORDER — SEMAGLUTIDE 1 MG/.5ML
1 INJECTION, SOLUTION SUBCUTANEOUS
Qty: 2 ML | Refills: 0 | Status: SHIPPED | OUTPATIENT
Start: 2023-08-28

## 2023-08-30 ENCOUNTER — TELEPHONE (OUTPATIENT)
Facility: CLINIC | Age: 48
End: 2023-08-30

## 2023-08-30 PROBLEM — Z11.59 ENCOUNTER FOR HEPATITIS C SCREENING TEST FOR LOW RISK PATIENT: Status: RESOLVED | Noted: 2023-07-31 | Resolved: 2023-08-30

## 2023-08-30 NOTE — TELEPHONE ENCOUNTER
----- Message from MICHELE Bowman sent at 8/30/2023 12:28 PM EDT -----  Regarding: AMPARO  Contact: [phone]  I gave my shot last night after I got off work. I didn't feel nauseous last night or today. I'm thinking I hope I did it right but I did. Maybe I'm getting use to it. I was wondering if you called the wagovy rep yet. They said for you to call since it was a user error. Will I stay on the 1 mg for a while since I jumped up dosages.

## 2023-08-30 NOTE — TELEPHONE ENCOUNTER
----- Message from MICHELE Denny sent at 8/30/2023  4:15 PM EDT -----  Regarding: Mony Juan: [phone]  I know your busy but if you can give me a call back at your earliest convenience I would greatly appreciate it.  Thank you

## 2023-09-05 ENCOUNTER — OFFICE VISIT (OUTPATIENT)
Age: 48
End: 2023-09-05
Payer: COMMERCIAL

## 2023-09-05 VITALS — SYSTOLIC BLOOD PRESSURE: 138 MMHG | BODY MASS INDEX: 43.05 KG/M2 | WEIGHT: 250.8 LBS | DIASTOLIC BLOOD PRESSURE: 73 MMHG

## 2023-09-05 DIAGNOSIS — Z01.419 WELL WOMAN EXAM WITH ROUTINE GYNECOLOGICAL EXAM: Primary | ICD-10-CM

## 2023-09-05 DIAGNOSIS — N95.1 SYMPTOMATIC MENOPAUSAL OR FEMALE CLIMACTERIC STATES: ICD-10-CM

## 2023-09-05 PROCEDURE — 99396 PREV VISIT EST AGE 40-64: CPT | Performed by: OBSTETRICS & GYNECOLOGY

## 2023-09-05 RX ORDER — ESTRADIOL 2 MG/1
2 TABLET ORAL DAILY
Qty: 90 TABLET | Refills: 3 | Status: SHIPPED | OUTPATIENT
Start: 2023-09-05

## 2023-09-11 RX ORDER — SEMAGLUTIDE 1 MG/.5ML
INJECTION, SOLUTION SUBCUTANEOUS
Qty: 2 ML | Refills: 0 | OUTPATIENT
Start: 2023-09-11

## 2023-09-11 NOTE — TELEPHONE ENCOUNTER
Future Appointments  9/11/2023 - 9/10/2025   Date Visit Type Department Provider    2/15/2024  8:30 AM OFFICE VISIT [address], APRN - NP   Appointment Notes:    6 months (around 1/31/2024) for follow up

## 2023-09-20 RX ORDER — SEMAGLUTIDE 1.7 MG/.75ML
1.7 INJECTION, SOLUTION SUBCUTANEOUS
Qty: 3 ML | Refills: 0 | Status: SHIPPED | OUTPATIENT
Start: 2023-09-20

## 2023-09-27 ENCOUNTER — HOSPITAL ENCOUNTER (EMERGENCY)
Facility: HOSPITAL | Age: 48
Discharge: HOME | End: 2023-09-27
Attending: STUDENT IN AN ORGANIZED HEALTH CARE EDUCATION/TRAINING PROGRAM
Payer: COMMERCIAL

## 2023-09-27 ENCOUNTER — APPOINTMENT (OUTPATIENT)
Facility: HOSPITAL | Age: 48
End: 2023-09-27
Payer: COMMERCIAL

## 2023-09-27 VITALS
HEART RATE: 85 BPM | DIASTOLIC BLOOD PRESSURE: 84 MMHG | BODY MASS INDEX: 43.05 KG/M2 | SYSTOLIC BLOOD PRESSURE: 154 MMHG | RESPIRATION RATE: 18 BRPM | HEIGHT: 64 IN | TEMPERATURE: 98.1 F | OXYGEN SATURATION: 98 %

## 2023-09-27 DIAGNOSIS — S39.012A BACK STRAIN, INITIAL ENCOUNTER: Primary | ICD-10-CM

## 2023-09-27 PROCEDURE — 6370000000 HC RX 637 (ALT 250 FOR IP): Performed by: STUDENT IN AN ORGANIZED HEALTH CARE EDUCATION/TRAINING PROGRAM

## 2023-09-27 PROCEDURE — 6360000002 HC RX W HCPCS: Performed by: STUDENT IN AN ORGANIZED HEALTH CARE EDUCATION/TRAINING PROGRAM

## 2023-09-27 PROCEDURE — 99284 EMERGENCY DEPT VISIT MOD MDM: CPT

## 2023-09-27 PROCEDURE — 96372 THER/PROPH/DIAG INJ SC/IM: CPT

## 2023-09-27 PROCEDURE — 72072 X-RAY EXAM THORAC SPINE 3VWS: CPT

## 2023-09-27 RX ORDER — ACETAMINOPHEN 500 MG
1000 TABLET ORAL
Status: COMPLETED | OUTPATIENT
Start: 2023-09-27 | End: 2023-09-27

## 2023-09-27 RX ORDER — KETOROLAC TROMETHAMINE 30 MG/ML
30 INJECTION, SOLUTION INTRAMUSCULAR; INTRAVENOUS ONCE
Status: DISCONTINUED | OUTPATIENT
Start: 2023-09-27 | End: 2023-09-27

## 2023-09-27 RX ORDER — LIDOCAINE 50 MG/G
1 PATCH TOPICAL DAILY
Qty: 10 PATCH | Refills: 0 | Status: SHIPPED | OUTPATIENT
Start: 2023-09-27 | End: 2023-10-07

## 2023-09-27 RX ORDER — CYCLOBENZAPRINE HCL 10 MG
10 TABLET ORAL
Status: COMPLETED | OUTPATIENT
Start: 2023-09-27 | End: 2023-09-27

## 2023-09-27 RX ORDER — KETOROLAC TROMETHAMINE 30 MG/ML
30 INJECTION, SOLUTION INTRAMUSCULAR; INTRAVENOUS ONCE
Status: COMPLETED | OUTPATIENT
Start: 2023-09-27 | End: 2023-09-27

## 2023-09-27 RX ORDER — CYCLOBENZAPRINE HCL 10 MG
10 TABLET ORAL 3 TIMES DAILY PRN
Qty: 21 TABLET | Refills: 0 | Status: SHIPPED | OUTPATIENT
Start: 2023-09-27 | End: 2023-10-07

## 2023-09-27 RX ADMIN — CYCLOBENZAPRINE 10 MG: 10 TABLET, FILM COATED ORAL at 19:17

## 2023-09-27 RX ADMIN — KETOROLAC TROMETHAMINE 30 MG: 30 INJECTION, SOLUTION INTRAMUSCULAR at 19:17

## 2023-09-27 RX ADMIN — ACETAMINOPHEN 1000 MG: 500 TABLET ORAL at 19:17

## 2023-09-27 ASSESSMENT — PAIN DESCRIPTION - LOCATION: LOCATION: BACK

## 2023-09-27 ASSESSMENT — PAIN DESCRIPTION - ORIENTATION: ORIENTATION: MID;RIGHT

## 2023-09-27 ASSESSMENT — PAIN - FUNCTIONAL ASSESSMENT: PAIN_FUNCTIONAL_ASSESSMENT: 0-10

## 2023-09-27 ASSESSMENT — PAIN SCALES - GENERAL: PAINLEVEL_OUTOF10: 8

## 2023-09-27 ASSESSMENT — PAIN DESCRIPTION - DESCRIPTORS: DESCRIPTORS: ACHING

## 2023-09-28 ASSESSMENT — ENCOUNTER SYMPTOMS
BACK PAIN: 1
SORE THROAT: 0
COUGH: 0
ABDOMINAL PAIN: 0
EYE PAIN: 0
VOMITING: 0
DIARRHEA: 0
SHORTNESS OF BREATH: 0
NAUSEA: 0

## 2023-09-28 NOTE — ED NOTES
No IV to be removed. Pt given dc paperwork. Pt tolerated dc well. Pt stated understanding of teaching and denied questions. Pt ambulated out of ER with all belongings.      Valdemar Mehta RN  09/27/23 2030

## 2023-10-16 ENCOUNTER — OFFICE VISIT (OUTPATIENT)
Age: 48
End: 2023-10-16

## 2023-10-16 VITALS
TEMPERATURE: 98 F | HEIGHT: 64 IN | DIASTOLIC BLOOD PRESSURE: 96 MMHG | BODY MASS INDEX: 42.37 KG/M2 | WEIGHT: 248.2 LBS | OXYGEN SATURATION: 97 % | SYSTOLIC BLOOD PRESSURE: 158 MMHG | HEART RATE: 73 BPM | RESPIRATION RATE: 18 BRPM

## 2023-10-16 DIAGNOSIS — M79.675 PAIN IN TOES OF BOTH FEET: ICD-10-CM

## 2023-10-16 DIAGNOSIS — L60.0 INGROWING NAIL: Primary | ICD-10-CM

## 2023-10-16 DIAGNOSIS — M79.674 PAIN IN TOES OF BOTH FEET: ICD-10-CM

## 2023-10-16 RX ORDER — AMMONIUM LACTATE 12 G/100G
CREAM TOPICAL
Qty: 385 G | Refills: 4 | Status: SHIPPED | OUTPATIENT
Start: 2023-10-16

## 2023-10-22 ASSESSMENT — ENCOUNTER SYMPTOMS
SHORTNESS OF BREATH: 0
DIARRHEA: 0
VOMITING: 0
ABDOMINAL PAIN: 0

## 2023-10-29 RX ORDER — SEMAGLUTIDE 1.7 MG/.75ML
1.7 INJECTION, SOLUTION SUBCUTANEOUS
Qty: 300 ML | Refills: 0 | OUTPATIENT
Start: 2023-10-29

## 2023-10-30 ENCOUNTER — OFFICE VISIT (OUTPATIENT)
Age: 48
End: 2023-10-30
Payer: COMMERCIAL

## 2023-10-30 VITALS
HEART RATE: 85 BPM | SYSTOLIC BLOOD PRESSURE: 148 MMHG | DIASTOLIC BLOOD PRESSURE: 84 MMHG | TEMPERATURE: 97.1 F | OXYGEN SATURATION: 96 %

## 2023-10-30 DIAGNOSIS — L60.0 INGROWING NAIL: Primary | ICD-10-CM

## 2023-10-30 PROCEDURE — 99212 OFFICE O/P EST SF 10 MIN: CPT | Performed by: PODIATRIST

## 2023-11-27 RX ORDER — SEMAGLUTIDE 1.7 MG/.75ML
1.7 INJECTION, SOLUTION SUBCUTANEOUS
OUTPATIENT
Start: 2023-11-27

## 2023-11-27 RX ORDER — SEMAGLUTIDE 1.7 MG/.75ML
1.7 INJECTION, SOLUTION SUBCUTANEOUS
Qty: 300 ML | Refills: 0 | Status: SHIPPED | OUTPATIENT
Start: 2023-11-27

## 2023-12-14 ENCOUNTER — PATIENT MESSAGE (OUTPATIENT)
Facility: CLINIC | Age: 48
End: 2023-12-14

## 2023-12-14 NOTE — TELEPHONE ENCOUNTER
LMTCB x1 12/14/2023 1310      From: Zulema Molina  To: Yelitza Leal  Sent: 12/14/2023  5:19 AM EST  Subject: Appointment Request    Appointment Request From: Zulema Molina    With Provider: CONCEPCION Stovall NP [[address]    Preferred Date Range: 12/15/2023 - 12/15/2023    Preferred Times: Any Time    Reason for visit: Request an Appointment    Comments:  I believe I have a sinus infection.  It's been going on for a couple of week

## 2023-12-28 RX ORDER — SEMAGLUTIDE 1.7 MG/.75ML
INJECTION, SOLUTION SUBCUTANEOUS
Qty: 3 ML | Refills: 1 | Status: SHIPPED | OUTPATIENT
Start: 2023-12-28

## 2024-01-05 ENCOUNTER — PATIENT MESSAGE (OUTPATIENT)
Facility: CLINIC | Age: 49
End: 2024-01-05

## 2024-01-05 NOTE — TELEPHONE ENCOUNTER
Duplicate message see other encounter       From: Marzena Roe  To: Armani Ramirez  Sent: 1/5/2024  9:17 AM EST  Subject: Appointment Request    Appointment Request From: Marzena Roe    With Provider: CONCEPCION Gan NP [[facility]]    Preferred Date Range: 1/5/2024 - 1/8/2024    Preferred Times: Any Time    Reason for visit: Request an Appointment    Comments:  Still congested in my head. Been taking meds. My ears are hurting.

## 2024-02-15 ENCOUNTER — OFFICE VISIT (OUTPATIENT)
Facility: CLINIC | Age: 49
End: 2024-02-15

## 2024-02-15 VITALS
SYSTOLIC BLOOD PRESSURE: 118 MMHG | BODY MASS INDEX: 41.48 KG/M2 | HEIGHT: 64 IN | OXYGEN SATURATION: 98 % | DIASTOLIC BLOOD PRESSURE: 84 MMHG | RESPIRATION RATE: 16 BRPM | WEIGHT: 243 LBS | HEART RATE: 74 BPM

## 2024-02-15 DIAGNOSIS — Z13.228 ENCOUNTER FOR SCREENING FOR OTHER METABOLIC DISORDERS: ICD-10-CM

## 2024-02-15 DIAGNOSIS — Z13.29 THYROID DISORDER SCREENING: ICD-10-CM

## 2024-02-15 DIAGNOSIS — E66.01 CLASS 3 SEVERE OBESITY DUE TO EXCESS CALORIES WITH SERIOUS COMORBIDITY AND BODY MASS INDEX (BMI) OF 40.0 TO 44.9 IN ADULT (HCC): ICD-10-CM

## 2024-02-15 DIAGNOSIS — Z13.220 SCREENING CHOLESTEROL LEVEL: ICD-10-CM

## 2024-02-15 DIAGNOSIS — Z83.3 FAMILY HISTORY OF DIABETES MELLITUS: Primary | ICD-10-CM

## 2024-02-15 DIAGNOSIS — Z13.0 SCREENING FOR DEFICIENCY ANEMIA: ICD-10-CM

## 2024-02-15 ASSESSMENT — PATIENT HEALTH QUESTIONNAIRE - PHQ9
1. LITTLE INTEREST OR PLEASURE IN DOING THINGS: NOT AT ALL
SUM OF ALL RESPONSES TO PHQ QUESTIONS 1-9: 0
SUM OF ALL RESPONSES TO PHQ QUESTIONS 1-9: 0
SUM OF ALL RESPONSES TO PHQ9 QUESTIONS 1 & 2: 0
SUM OF ALL RESPONSES TO PHQ QUESTIONS 1-9: 0
2. FEELING DOWN, DEPRESSED OR HOPELESS: NOT AT ALL
SUM OF ALL RESPONSES TO PHQ QUESTIONS 1-9: 0

## 2024-02-15 NOTE — PROGRESS NOTES
Chief Complaint   Patient presents with    6 Month Follow-Up     Fatigued often.     PHQ-9 Total Score: 0 (2/15/2024  8:37 AM)    \"Have you been to the ER, urgent care clinic since your last visit?  Hospitalized since your last visit?\"    NO    “Have you seen or consulted any other health care providers outside of [facility] since your last visit?”    NO              Resp 16   Ht 1.626 m (5' 4\")   Wt 110.2 kg (243 lb)   BMI 41.71 kg/m²       11/30/2021     9:20 AM   Amb Fall Risk Assessment and TUG Test   Fall in past 12 months? 0   Able to walk? Yes

## 2024-02-15 NOTE — PROGRESS NOTES
This encounter note is being administratively closed in accordance with the defined policies, procedures, and workflows established by [facility].  I cannot validate the clinical content accuracy of this information. -Jannie Sung MD       Subjective    Chief Complaint   Patient presents with    6 Month Follow-Up     Fatigued often.       HPI:    Marzena Roe is a 48 y.o. female.  48-year-old female presents for follow-up of her chronic conditions which include seasonal allergies, dry skin, gastroesophageal reflux, vertigo, obesity, and she has a family history of hypothyroidism and also a family history of hypercholesterolemia.  She is adherent with her medication regimen.  She is disappointed in her weight loss of 15 pounds since she started on the Wegovy.  Her 24-hour food recall is a cracking eggs with 2 eggs, lunch was a  salad from [employer] with Asmita dressing, and dinner was a fish sandwich combo at [employer] with sweet iced tea.  She states she tries to drink mostly water but occasionally 2-3 times a week she does drink sweet tea and at least once a week a diet Dr. Pepper.  She has started exercising and is up to once a week for at least an hour.  In discussing her 24-hour recall patient was able to see exactly what parts of her diet needed to be remodeled and she was given written material.  She is followed by podiatry and other than a weight loss has no specific complaints at this time.  Of note is a colonoscopy in September with a repeat date of 10 years.      Current Outpatient Medications on File Prior to Visit   Medication Sig Dispense Refill    Semaglutide-Weight Management (WEGOVY) 1.7 MG/0.75ML SOAJ SC injection INJECT 1.7 MG UNDER THE SKIN EVERY 7 DAYS 3 mL 1    Fexofenadine HCl (ALLEGRA ALLERGY PO) Take 1 tablet by mouth as needed      ammonium lactate (LAC-HYDRIN) 12 % cream Apply topically to the bilateral feet 2 times a day 385 g 4    estradiol (ESTRACE) 2 MG tablet Take 1

## 2024-02-16 LAB
ALBUMIN SERPL-MCNC: 4.2 G/DL (ref 3.9–4.9)
ALBUMIN/GLOB SERPL: 1.8 {RATIO} (ref 1.2–2.2)
ALP SERPL-CCNC: 60 IU/L (ref 44–121)
ALT SERPL-CCNC: 14 IU/L (ref 0–32)
AST SERPL-CCNC: 15 IU/L (ref 0–40)
BASOPHILS # BLD AUTO: 0.1 X10E3/UL (ref 0–0.2)
BASOPHILS NFR BLD AUTO: 1 %
BILIRUB SERPL-MCNC: <0.2 MG/DL (ref 0–1.2)
BUN SERPL-MCNC: 15 MG/DL (ref 6–24)
BUN/CREAT SERPL: 18 (ref 9–23)
CALCIUM SERPL-MCNC: 9.5 MG/DL (ref 8.7–10.2)
CHLORIDE SERPL-SCNC: 103 MMOL/L (ref 96–106)
CHOLEST SERPL-MCNC: 206 MG/DL (ref 100–199)
CO2 SERPL-SCNC: 22 MMOL/L (ref 20–29)
CREAT SERPL-MCNC: 0.85 MG/DL (ref 0.57–1)
EGFRCR SERPLBLD CKD-EPI 2021: 84 ML/MIN/1.73
EOSINOPHIL # BLD AUTO: 0.3 X10E3/UL (ref 0–0.4)
EOSINOPHIL NFR BLD AUTO: 3 %
ERYTHROCYTE [DISTWIDTH] IN BLOOD BY AUTOMATED COUNT: 12 % (ref 11.7–15.4)
GLOBULIN SER CALC-MCNC: 2.3 G/DL (ref 1.5–4.5)
GLUCOSE SERPL-MCNC: 87 MG/DL (ref 70–99)
HBA1C MFR BLD: 5.4 % (ref 4.8–5.6)
HCT VFR BLD AUTO: 42.4 % (ref 34–46.6)
HDLC SERPL-MCNC: 70 MG/DL
HGB BLD-MCNC: 14.2 G/DL (ref 11.1–15.9)
IMM GRANULOCYTES # BLD AUTO: 0 X10E3/UL (ref 0–0.1)
IMM GRANULOCYTES NFR BLD AUTO: 0 %
LDLC SERPL CALC-MCNC: 113 MG/DL (ref 0–99)
LYMPHOCYTES # BLD AUTO: 3.2 X10E3/UL (ref 0.7–3.1)
LYMPHOCYTES NFR BLD AUTO: 29 %
MCH RBC QN AUTO: 28.9 PG (ref 26.6–33)
MCHC RBC AUTO-ENTMCNC: 33.5 G/DL (ref 31.5–35.7)
MCV RBC AUTO: 86 FL (ref 79–97)
MONOCYTES # BLD AUTO: 0.8 X10E3/UL (ref 0.1–0.9)
MONOCYTES NFR BLD AUTO: 7 %
NEUTROPHILS # BLD AUTO: 6.5 X10E3/UL (ref 1.4–7)
NEUTROPHILS NFR BLD AUTO: 60 %
PLATELET # BLD AUTO: 297 X10E3/UL (ref 150–450)
POTASSIUM SERPL-SCNC: 4.3 MMOL/L (ref 3.5–5.2)
PROT SERPL-MCNC: 6.5 G/DL (ref 6–8.5)
RBC # BLD AUTO: 4.91 X10E6/UL (ref 3.77–5.28)
SODIUM SERPL-SCNC: 140 MMOL/L (ref 134–144)
TRIGL SERPL-MCNC: 132 MG/DL (ref 0–149)
TSH SERPL DL<=0.005 MIU/L-ACNC: 2.56 UIU/ML (ref 0.45–4.5)
VLDLC SERPL CALC-MCNC: 23 MG/DL (ref 5–40)
WBC # BLD AUTO: 10.9 X10E3/UL (ref 3.4–10.8)

## 2024-02-22 ENCOUNTER — TELEPHONE (OUTPATIENT)
Facility: CLINIC | Age: 49
End: 2024-02-22

## 2024-02-22 NOTE — TELEPHONE ENCOUNTER
----- Message from Sherry Ramirez sent at 2/22/2024  1:59 PM EST -----  Subject: Message to Provider    QUESTIONS  Information for Provider? pt already paid her co-pay for her jamarcus she got a   call pcp was not in. will her co -p ay be refunded please call about this   issues   ---------------------------------------------------------------------------  --------------  CALL BACK INFO  [phone]; OK to leave message on voicemail  ---------------------------------------------------------------------------  --------------  SCRIPT ANSWERS  Relationship to Patient? Self

## 2024-02-23 PROBLEM — E78.00 PURE HYPERCHOLESTEROLEMIA: Status: ACTIVE | Noted: 2024-02-23

## 2024-02-25 ENCOUNTER — HOSPITAL ENCOUNTER (EMERGENCY)
Facility: HOSPITAL | Age: 49
Discharge: HOME | End: 2024-02-25
Attending: EMERGENCY MEDICINE
Payer: COMMERCIAL

## 2024-02-25 VITALS
OXYGEN SATURATION: 99 % | TEMPERATURE: 98 F | SYSTOLIC BLOOD PRESSURE: 134 MMHG | HEART RATE: 69 BPM | RESPIRATION RATE: 18 BRPM | DIASTOLIC BLOOD PRESSURE: 72 MMHG

## 2024-02-25 DIAGNOSIS — J06.9 VIRAL UPPER RESPIRATORY TRACT INFECTION: Primary | ICD-10-CM

## 2024-02-25 LAB
FLUAV RNA SPEC QL NAA+PROBE: NOT DETECTED
FLUBV RNA SPEC QL NAA+PROBE: NOT DETECTED
SARS-COV-2 RNA RESP QL NAA+PROBE: NOT DETECTED

## 2024-02-25 PROCEDURE — 6370000000 HC RX 637 (ALT 250 FOR IP)

## 2024-02-25 PROCEDURE — 99283 EMERGENCY DEPT VISIT LOW MDM: CPT

## 2024-02-25 PROCEDURE — 87636 SARSCOV2 & INF A&B AMP PRB: CPT

## 2024-02-25 RX ORDER — IBUPROFEN 800 MG/1
800 TABLET ORAL
Status: COMPLETED | OUTPATIENT
Start: 2024-02-25 | End: 2024-02-25

## 2024-02-25 RX ADMIN — IBUPROFEN 800 MG: 800 TABLET, FILM COATED ORAL at 08:17

## 2024-02-25 ASSESSMENT — LIFESTYLE VARIABLES
HOW MANY STANDARD DRINKS CONTAINING ALCOHOL DO YOU HAVE ON A TYPICAL DAY: PATIENT DOES NOT DRINK
HOW OFTEN DO YOU HAVE A DRINK CONTAINING ALCOHOL: NEVER

## 2024-02-25 NOTE — ED PROVIDER NOTES
[facility]GENCY DEPT  EMERGENCY DEPARTMENT ENCOUNTER      Pt Name: Marzena Roe  MRN: [id number]  Birthdate 1975  Date of evaluation: 2/25/2024  Provider: Elie Rocha PA-C    CHIEF COMPLAINT       Chief Complaint   Patient presents with    flu like symtpoms         HISTORY OF PRESENT ILLNESS   (Location/Symptom, Timing/Onset, Context/Setting, Quality, Duration, Modifying Factors, Severity)  Note limiting factors.   48-year-old female with no significant past medical history presents with complaint of cough, congestion, sore throat, ear fullness for the past 4 days.  Patient reports that her cough is dry and nonproductive.  She had been taking over-the-counter medications with minimal relief.  Denies fever, chills, body aches, chest pain, shortness of breath, abdominal pain, nausea, vomiting, changes in bowel or bladder.            Review of External Medical Records:     Nursing Notes were reviewed.    REVIEW OF SYSTEMS    (2-9 systems for level 4, 10 or more for level 5)     Review of Systems    Except as noted above the remainder of the review of systems was reviewed and negative.       PAST MEDICAL HISTORY     Past Medical History:   Diagnosis Date    Allergies     Callus     Fallen arches     Hearing loss     Ingrown toenail     Obesity     Plantar fasciitis     Vertigo          SURGICAL HISTORY       Past Surgical History:   Procedure Laterality Date    ARM SURGERY      HYSTERECTOMY (CERVIX STATUS UNKNOWN)      HYSTERECTOMY, VAGINAL      TONSILLECTOMY           CURRENT MEDICATIONS       Previous Medications    AMMONIUM LACTATE (LAC-HYDRIN) 12 % CREAM    Apply topically to the bilateral feet 2 times a day    ESTRADIOL (ESTRACE) 2 MG TABLET    Take 1 tablet by mouth daily    FEXOFENADINE HCL (ALLEGRA ALLERGY PO)    Take 1 tablet by mouth as needed    ONDANSETRON (ZOFRAN) 4 MG TABLET    Take one tablet by mouth 20-30 minutes before taking Wegovy    SEMAGLUTIDE-WEIGHT MANAGEMENT (WEGOVY) 1.7

## 2024-02-25 NOTE — ED TRIAGE NOTES
Patient arrives to ED ambulatory w/o difficulty. No acute distress noted in triage. A&O x 4. Skin is warm, dry & intact on obs. PT reports sore throat, nasal congestion, bilateral ear fullness, dry cough that began Wednesday. Denies fever. Reports taking OTC sinus meds with no relief.

## 2024-02-27 ENCOUNTER — TELEPHONE (OUTPATIENT)
Facility: CLINIC | Age: 49
End: 2024-02-27

## 2024-02-27 NOTE — TELEPHONE ENCOUNTER
----- Message from Kalen Porras sent at 2/26/2024  4:30 PM EST -----  Subject: Appointment Request    Reason for Call: Established Patient Appointment needed: Routine ED Follow   Up Visit    QUESTIONS    Reason for appointment request? No appointments available during search     Additional Information for Provider? Patient needs a ED FU. PLEASE CALL to   schedule.   ---------------------------------------------------------------------------  --------------  CALL BACK INFO  [phone]; OK to leave message on voicemail  ---------------------------------------------------------------------------  --------------  SCRIPT ANSWERS

## 2024-02-27 NOTE — TELEPHONE ENCOUNTER
Patient is sending messages in [facility] about her still being a sick. She has an appointment for Friday with JEN but would like to know if someone could work her in earlier than Friday. Please call patient at [phone].

## 2024-02-27 NOTE — TELEPHONE ENCOUNTER
Called and spoke with patient. Advised to keep appointment for 03/01/2024. If symptoms worsen to go to Urgent Care for eval/treat.

## 2024-02-29 ENCOUNTER — APPOINTMENT (OUTPATIENT)
Facility: HOSPITAL | Age: 49
End: 2024-02-29
Payer: COMMERCIAL

## 2024-02-29 ENCOUNTER — HOSPITAL ENCOUNTER (EMERGENCY)
Facility: HOSPITAL | Age: 49
Discharge: HOME | End: 2024-02-29
Attending: EMERGENCY MEDICINE
Payer: COMMERCIAL

## 2024-02-29 VITALS
HEART RATE: 73 BPM | BODY MASS INDEX: 40.29 KG/M2 | RESPIRATION RATE: 18 BRPM | TEMPERATURE: 97.8 F | WEIGHT: 236 LBS | OXYGEN SATURATION: 96 % | SYSTOLIC BLOOD PRESSURE: 159 MMHG | HEIGHT: 64 IN | DIASTOLIC BLOOD PRESSURE: 96 MMHG

## 2024-02-29 DIAGNOSIS — J06.9 VIRAL URI WITH COUGH: Primary | ICD-10-CM

## 2024-02-29 PROCEDURE — 71046 X-RAY EXAM CHEST 2 VIEWS: CPT

## 2024-02-29 PROCEDURE — 99283 EMERGENCY DEPT VISIT LOW MDM: CPT

## 2024-02-29 RX ORDER — BENZONATATE 100 MG/1
100-200 CAPSULE ORAL 3 TIMES DAILY PRN
Qty: 30 CAPSULE | Refills: 0 | Status: SHIPPED | OUTPATIENT
Start: 2024-02-29 | End: 2024-03-05

## 2024-02-29 ASSESSMENT — PAIN - FUNCTIONAL ASSESSMENT: PAIN_FUNCTIONAL_ASSESSMENT: 0-10

## 2024-02-29 ASSESSMENT — PAIN DESCRIPTION - LOCATION: LOCATION: CHEST

## 2024-02-29 ASSESSMENT — PAIN SCALES - GENERAL: PAINLEVEL_OUTOF10: 5

## 2024-02-29 ASSESSMENT — PAIN DESCRIPTION - DESCRIPTORS: DESCRIPTORS: ACHING

## 2024-02-29 NOTE — ED TRIAGE NOTES
PT reports a cough for a week now, PT has been taking many OTC meds robitussin, delson,. PT reports SOB when she walks. PT reports sore throat and nasal congestion as well.

## 2024-02-29 NOTE — ED PROVIDER NOTES
[facility] EMERGENCY DEPT  EMERGENCY DEPARTMENT ENCOUNTER      Pt Name: Marzena Roe  MRN: [id number]  Birthdate 1975  Date of evaluation: 2/29/2024  Provider: Elie Rocha PA-C    CHIEF COMPLAINT       Chief Complaint   Patient presents with    Cough    Nasal Congestion         HISTORY OF PRESENT ILLNESS   (Location/Symptom, Timing/Onset, Context/Setting, Quality, Duration, Modifying Factors, Severity)  Note limiting factors.   48-year-old female with past medical history of asthma presents with complaint of ongoing cough.  Patient states that she has had a cough for about a week now.  It was associated with nasal congestion and sore throat.  Those symptoms are improving, however the cough is persisting.  Reports associated chest pain that is only present with coughing.  States she gets more easily short of breath with walking.  She is comfortable at rest.  Denies fever, abdominal pain, nausea, vomiting, changes in bowel or bladder.  She has been taking Delsym, Robitussin, and other over-the-counter medications with minimal relief.            Review of External Medical Records:     Nursing Notes were reviewed.    REVIEW OF SYSTEMS    (2-9 systems for level 4, 10 or more for level 5)     Review of Systems    Except as noted above the remainder of the review of systems was reviewed and negative.       PAST MEDICAL HISTORY     Past Medical History:   Diagnosis Date    Allergies     Asthma     Callus     Fallen arches     Hearing loss     Ingrown toenail     Obesity     Plantar fasciitis     Vertigo          SURGICAL HISTORY       Past Surgical History:   Procedure Laterality Date    ARM SURGERY      HYSTERECTOMY (CERVIX STATUS UNKNOWN)      HYSTERECTOMY, VAGINAL      TONSILLECTOMY           CURRENT MEDICATIONS       Previous Medications    AMMONIUM LACTATE (LAC-HYDRIN) 12 % CREAM    Apply topically to the bilateral feet 2 times a day    ESTRADIOL (ESTRACE) 2 MG TABLET    Take 1 tablet by mouth daily

## 2024-03-01 ENCOUNTER — OFFICE VISIT (OUTPATIENT)
Facility: CLINIC | Age: 49
End: 2024-03-01
Payer: COMMERCIAL

## 2024-03-01 VITALS — TEMPERATURE: 98.2 F | OXYGEN SATURATION: 98 % | HEART RATE: 78 BPM

## 2024-03-01 DIAGNOSIS — J06.9 VIRAL URI: Primary | ICD-10-CM

## 2024-03-01 DIAGNOSIS — J00 ACUTE RHINITIS: ICD-10-CM

## 2024-03-01 PROCEDURE — 99213 OFFICE O/P EST LOW 20 MIN: CPT | Performed by: FAMILY MEDICINE

## 2024-03-01 RX ORDER — FLUTICASONE PROPIONATE 50 MCG
2 SPRAY, SUSPENSION (ML) NASAL DAILY
Qty: 16 G | Refills: 0 | Status: SHIPPED | OUTPATIENT
Start: 2024-03-01

## 2024-03-01 NOTE — PROGRESS NOTES
Chief Complaint   Patient presents with    URI     Seen in ED x2 for same symptoms.       \"Have you been to the ER, urgent care clinic since your last visit?  Hospitalized since your last visit?\"    YES - When: approximately 1  weeks ago.  Where and Why: Free Standing ED for URI symptoms.    “Have you seen or consulted any other health care providers outside of [facility] since your last visit?”    NO         
movement throughout.   Extrem: Atraumatic, no cyanosis  Skin: Warm, dry  Neuro: Alert, oriented, appropriate      A/P:     ICD-10-CM    1. Viral URI  J06.9       2. Acute rhinitis  J00 fluticasone (FLONASE) 50 MCG/ACT nasal spray         1. Viral URI: Initial symptoms have improved but cough continues. Continue symptomatic treatment with OTC medications and rest. Advised on normal time course to resolution of these symptoms.    2. Acute rhinitis: Advised she can add Flonase to regimen which would help with postnasal drip that may be causing the cough.   - fluticasone (FLONASE) 50 MCG/ACT nasal spray; 2 sprays by Each Nostril route daily  Dispense: 16 g; Refill: 0       RTC prn if symptoms worsen or fail to improve    Discussed diagnoses in detail with patient.   Medication risks/benefits/side effects discussed with patient.   All of the patient's questions were addressed. The patient understands and agrees with our plan of care.  The patient knows to call back if they are unsure of or forget any changes we discussed today or if the symptoms change.  The patient received an After-Visit Summary which contains VS, orders, medication list and allergy list. This can be used as a \"mini-medical record\" should they have to seek medical care while out of town.    Current Outpatient Medications on File Prior to Visit   Medication Sig Dispense Refill    benzonatate (TESSALON) 100 MG capsule Take 1-2 capsules by mouth 3 times daily as needed for Cough 30 capsule 0    Semaglutide-Weight Management (WEGOVY) 1.7 MG/0.75ML SOAJ SC injection INJECT 1.7 MG UNDER THE SKIN EVERY 7 DAYS 3 mL 1    Fexofenadine HCl (ALLEGRA ALLERGY PO) Take 1 tablet by mouth as needed      ammonium lactate (LAC-HYDRIN) 12 % cream Apply topically to the bilateral feet 2 times a day 385 g 4    estradiol (ESTRACE) 2 MG tablet Take 1 tablet by mouth daily 90 tablet 3    ondansetron (ZOFRAN) 4 MG tablet Take one tablet by mouth 20-30 minutes before taking

## 2024-03-08 ENCOUNTER — PATIENT MESSAGE (OUTPATIENT)
Facility: CLINIC | Age: 49
End: 2024-03-08

## 2024-03-08 DIAGNOSIS — E66.01 CLASS 3 SEVERE OBESITY DUE TO EXCESS CALORIES WITH SERIOUS COMORBIDITY AND BODY MASS INDEX (BMI) OF 40.0 TO 44.9 IN ADULT (HCC): Primary | ICD-10-CM

## 2024-03-08 DIAGNOSIS — E66.01 CLASS 3 SEVERE OBESITY DUE TO EXCESS CALORIES WITH SERIOUS COMORBIDITY AND BODY MASS INDEX (BMI) OF 40.0 TO 44.9 IN ADULT (HCC): ICD-10-CM

## 2024-03-08 RX ORDER — SEMAGLUTIDE 2.4 MG/.75ML
2.4 INJECTION, SOLUTION SUBCUTANEOUS
Qty: 9 ML | Refills: 1 | Status: SHIPPED | OUTPATIENT
Start: 2024-03-08

## 2024-03-14 NOTE — TELEPHONE ENCOUNTER
Kodi, Processor 3/14/2024 9:45 AM EDT    Ok thank you. I talked to the insurance company yesterday and they said because of the prior authorization. They said all y'all have to do is put the information that I sent to the insurance company

## 2024-03-15 DIAGNOSIS — E66.01 CLASS 3 SEVERE OBESITY DUE TO EXCESS CALORIES WITH SERIOUS COMORBIDITY AND BODY MASS INDEX (BMI) OF 40.0 TO 44.9 IN ADULT (HCC): ICD-10-CM

## 2024-03-15 RX ORDER — SEMAGLUTIDE 2.4 MG/.75ML
2.4 INJECTION, SOLUTION SUBCUTANEOUS
Qty: 9 ML | Refills: 1 | Status: SHIPPED | OUTPATIENT
Start: 2024-03-15

## 2024-03-15 RX ORDER — SEMAGLUTIDE 2.4 MG/.75ML
2.4 INJECTION, SOLUTION SUBCUTANEOUS
Qty: 9 ML | Refills: 1 | Status: SHIPPED | OUTPATIENT
Start: 2024-03-15 | End: 2024-03-15 | Stop reason: SDUPTHER

## 2024-03-15 NOTE — PROGRESS NOTES
New rx for Wegovy sent in. It was previously denied because the insurance was saying she has not lost and maintained off 5% of her body weight, however per chart review she went from 258lbs before starting it to 236lbs at her last weigh-in. When PA is triggered can we please include this information? Thanks!

## 2024-03-16 PROBLEM — Z13.220 SCREENING CHOLESTEROL LEVEL: Status: RESOLVED | Noted: 2023-07-31 | Resolved: 2024-03-16

## 2024-03-18 ENCOUNTER — OFFICE VISIT (OUTPATIENT)
Age: 49
End: 2024-03-18
Payer: COMMERCIAL

## 2024-03-18 VITALS
DIASTOLIC BLOOD PRESSURE: 80 MMHG | OXYGEN SATURATION: 96 % | SYSTOLIC BLOOD PRESSURE: 138 MMHG | TEMPERATURE: 97.8 F | WEIGHT: 236 LBS | HEIGHT: 64 IN | BODY MASS INDEX: 40.29 KG/M2 | HEART RATE: 76 BPM

## 2024-03-18 DIAGNOSIS — L84 CORNS AND CALLOSITIES: ICD-10-CM

## 2024-03-18 DIAGNOSIS — L60.0 INGROWING NAIL: Primary | ICD-10-CM

## 2024-03-18 PROCEDURE — 99213 OFFICE O/P EST LOW 20 MIN: CPT | Performed by: PODIATRIST

## 2024-03-18 RX ORDER — AMMONIUM LACTATE 12 G/100G
CREAM TOPICAL
Qty: 385 G | Refills: 4 | Status: SHIPPED | OUTPATIENT
Start: 2024-03-18 | End: 2024-03-22 | Stop reason: CLARIF

## 2024-03-18 ASSESSMENT — ENCOUNTER SYMPTOMS
DIARRHEA: 0
ABDOMINAL PAIN: 0
SHORTNESS OF BREATH: 0
VOMITING: 0

## 2024-03-18 NOTE — PROGRESS NOTES
Chief Complaint   Patient presents with    Follow-up    Ingrown Toenail     /80 (Site: Left Upper Arm, Position: Sitting, Cuff Size: Large Adult)   Pulse 76   Temp 97.8 °F (36.6 °C) (Temporal)   Ht 1.626 m (5' 4\")   Wt 107 kg (236 lb)   SpO2 96%   BMI 40.51 kg/m²     1. Have you been to the ER, urgent care clinic since your last visit?  Hospitalized since your last visit? 02/29/24 Viral URI [facility]     2. Have you seen or consulted any other health care providers outside of the [facility] since your last visit?  Include any pap smears or colon screening. No

## 2024-03-18 NOTE — PROGRESS NOTES
[facility]         Patient Name: Marzena Roe    : 1975    Visit Date: 3/18/2024    Office Visit Note      Subjective:         Patient is a 48 y.o. female who is being seen in office follow up visit for pain to left hallux.  Patient states that she has noticed her left hallux toenail to have become incurvated.  Patient states that she been applying hydration to bilateral feet and tried sleeping has improved at this time.    Past Medical History:   Diagnosis Date    Allergies     Asthma     Callus     Fallen arches     Hearing loss     Ingrown toenail     Obesity     Plantar fasciitis     Vertigo      Past Surgical History:   Procedure Laterality Date    ARM SURGERY      HYSTERECTOMY (CERVIX STATUS UNKNOWN)      HYSTERECTOMY, VAGINAL      TONSILLECTOMY         Family History   Problem Relation Age of Onset    Thyroid Disease Mother     High Cholesterol Mother     Colon Polyps Mother     Anemia Mother     Hearing Loss Mother     Diabetes Father     Hypertension Father     Heart Attack Father     Prostate Cancer Father     Cancer Father     Gout Father     Thyroid Disease Sister     Gout Sister     Thyroid Disease Paternal Grandfather     Thyroid Disease Niece     Diabetes Paternal Grandmother     Stroke Paternal Grandmother     High Blood Pressure Brother       Social History     Tobacco Use    Smoking status: Former     Current packs/day: 0.03     Average packs/day: (0.2 ttl pk-yrs)     Types: Cigarettes    Smokeless tobacco: Never   Substance Use Topics    Alcohol use: Not Currently     Comment: rarely     No Known Allergies  Prior to Admission medications    Medication Sig Start Date End Date Taking? Authorizing Provider   ciclopirox (LOPROX) 0.77 % cream Apply topically 2 times daily to affected toenails. 3/18/24  Yes Pipo Mendoza DPM   ammonium lactate (AMLACTIN) 12 % cream Apply topically as needed. 3/18/24 4/17/24 Yes Pipo Mendoza DPM   Semaglutide-Weight Management

## 2024-03-22 ENCOUNTER — OFFICE VISIT (OUTPATIENT)
Facility: CLINIC | Age: 49
End: 2024-03-22
Payer: COMMERCIAL

## 2024-03-22 VITALS
OXYGEN SATURATION: 97 % | SYSTOLIC BLOOD PRESSURE: 128 MMHG | DIASTOLIC BLOOD PRESSURE: 82 MMHG | TEMPERATURE: 97.5 F | HEART RATE: 86 BPM | WEIGHT: 237 LBS | BODY MASS INDEX: 40.46 KG/M2 | HEIGHT: 64 IN

## 2024-03-22 DIAGNOSIS — E66.01 CLASS 3 SEVERE OBESITY DUE TO EXCESS CALORIES WITH SERIOUS COMORBIDITY AND BODY MASS INDEX (BMI) OF 40.0 TO 44.9 IN ADULT (HCC): ICD-10-CM

## 2024-03-22 DIAGNOSIS — S39.012A STRAIN OF LUMBAR REGION, INITIAL ENCOUNTER: Primary | ICD-10-CM

## 2024-03-22 PROCEDURE — 99214 OFFICE O/P EST MOD 30 MIN: CPT | Performed by: FAMILY MEDICINE

## 2024-03-22 RX ORDER — CYCLOBENZAPRINE HCL 5 MG
5 TABLET ORAL 3 TIMES DAILY PRN
Qty: 30 TABLET | Refills: 0 | Status: SHIPPED | OUTPATIENT
Start: 2024-03-22 | End: 2024-04-01

## 2024-03-22 NOTE — PROGRESS NOTES
Chief Complaint   Patient presents with    Back Pain     Lower back pain for a few days       \"Have you been to the ER, urgent care clinic since your last visit?  Hospitalized since your last visit?\"    NO    “Have you seen or consulted any other health care providers outside of [facility] since your last visit?”    NO            Click Here for Release of Records Request

## 2024-03-22 NOTE — PROGRESS NOTES
[facility]      HPI: Pt is a 48 y.o. female who presents for back pain. Pain is over the R side of her lower back and is not radiating. Started 3 days ago after she had gone on a walk with her daughter. Feels tender to touch. No dysuria or other changes with her urine. She has tried Advil, heating pads and IcyHot which have helped some.       Past Medical History:   Diagnosis Date    Allergies     Asthma     Callus     Fallen arches     Hearing loss     Ingrown toenail     Obesity     Plantar fasciitis     Vertigo        Family History   Problem Relation Age of Onset    Thyroid Disease Mother     High Cholesterol Mother     Colon Polyps Mother     Anemia Mother     Hearing Loss Mother     Diabetes Father     Hypertension Father     Heart Attack Father     Prostate Cancer Father     Cancer Father     Gout Father     Thyroid Disease Sister     Gout Sister     Thyroid Disease Paternal Grandfather     Thyroid Disease Niece     Diabetes Paternal Grandmother     Stroke Paternal Grandmother     High Blood Pressure Brother        Social History     Tobacco Use    Smoking status: Former     Current packs/day: 0.03     Average packs/day: (0.2 ttl pk-yrs)     Types: Cigarettes    Smokeless tobacco: Never   Vaping Use    Vaping Use: Never used   Substance Use Topics    Alcohol use: Not Currently     Comment: rarely    Drug use: Never       ROS:  Per HPI    PE:  /82 (Site: Left Upper Arm, Position: Sitting)   Pulse 86   Temp 97.5 °F (36.4 °C) (Temporal)   Ht 1.626 m (5' 4\")   Wt 107.5 kg (237 lb)   SpO2 97%   BMI 40.68 kg/m²   Gen: Pt sitting in chair, in NAD  Head: Normocephalic, atraumatic  Eyes: Sclera anicteric, EOM grossly intact, PERRL  Ears: TM's pearly with good light reflex b/l  Nose: Normal nasal mucosa  Throat: MMM, normal lips, tongue, teeth and gums  Neck: Supple  CVS: Normal S1, S2, no m/r/g  Resp: CTAB, no wheezes or rales  MSK:  Back: No TTP along spine, +TTP over R sided lumbar

## 2024-05-17 ENCOUNTER — HOSPITAL ENCOUNTER (EMERGENCY)
Facility: HOSPITAL | Age: 49
Discharge: HOME | End: 2024-05-17
Attending: EMERGENCY MEDICINE
Payer: COMMERCIAL

## 2024-05-17 ENCOUNTER — PATIENT MESSAGE (OUTPATIENT)
Facility: CLINIC | Age: 49
End: 2024-05-17

## 2024-05-17 VITALS
OXYGEN SATURATION: 95 % | WEIGHT: 230 LBS | BODY MASS INDEX: 38.32 KG/M2 | SYSTOLIC BLOOD PRESSURE: 133 MMHG | TEMPERATURE: 98.1 F | RESPIRATION RATE: 18 BRPM | DIASTOLIC BLOOD PRESSURE: 87 MMHG | HEART RATE: 85 BPM | HEIGHT: 65 IN

## 2024-05-17 DIAGNOSIS — J06.9 ACUTE UPPER RESPIRATORY INFECTION: Primary | ICD-10-CM

## 2024-05-17 LAB
DEPRECATED S PYO AG THROAT QL EIA: NEGATIVE
SARS-COV-2 RDRP RESP QL NAA+PROBE: NOT DETECTED
SOURCE: NORMAL

## 2024-05-17 PROCEDURE — 6370000000 HC RX 637 (ALT 250 FOR IP): Performed by: PHYSICIAN ASSISTANT

## 2024-05-17 PROCEDURE — 99283 EMERGENCY DEPT VISIT LOW MDM: CPT

## 2024-05-17 PROCEDURE — 87070 CULTURE OTHR SPECIMN AEROBIC: CPT

## 2024-05-17 PROCEDURE — 87635 SARS-COV-2 COVID-19 AMP PRB: CPT

## 2024-05-17 PROCEDURE — 87880 STREP A ASSAY W/OPTIC: CPT

## 2024-05-17 RX ORDER — ACETAMINOPHEN 325 MG/1
650 TABLET ORAL
Status: COMPLETED | OUTPATIENT
Start: 2024-05-17 | End: 2024-05-17

## 2024-05-17 RX ADMIN — ACETAMINOPHEN 650 MG: 325 TABLET ORAL at 10:15

## 2024-05-17 ASSESSMENT — ENCOUNTER SYMPTOMS
EYE DISCHARGE: 0
SHORTNESS OF BREATH: 0
WHEEZING: 0
SORE THROAT: 1
SINUS PRESSURE: 0
RHINORRHEA: 0
COUGH: 1

## 2024-05-17 ASSESSMENT — PAIN SCALES - GENERAL: PAINLEVEL_OUTOF10: 7

## 2024-05-17 ASSESSMENT — PAIN DESCRIPTION - ORIENTATION: ORIENTATION: ANTERIOR

## 2024-05-17 ASSESSMENT — PAIN - FUNCTIONAL ASSESSMENT
PAIN_FUNCTIONAL_ASSESSMENT: ACTIVITIES ARE NOT PREVENTED
PAIN_FUNCTIONAL_ASSESSMENT: 0-10

## 2024-05-17 ASSESSMENT — PAIN DESCRIPTION - LOCATION: LOCATION: THROAT

## 2024-05-17 ASSESSMENT — PAIN DESCRIPTION - DESCRIPTORS: DESCRIPTORS: SORE

## 2024-05-17 ASSESSMENT — PAIN DESCRIPTION - PAIN TYPE: TYPE: ACUTE PAIN

## 2024-05-17 NOTE — ED TRIAGE NOTES
Pt c/o sore throat and nasal/ear congestion x2 days. Pt state she did not take anything OTC for symptoms this morning.   Patient arrives to ED ambulatory w/o difficulty. No acute distress noted in triage. A&O x 4. Skin is warm, dry & intact on obs.

## 2024-05-17 NOTE — DISCHARGE INSTRUCTIONS
Treat symptomatically and supportive care, ensure you are drinking liquids, extra than normal, and take tylenol or ibuprofen as needed for discomfort.    If you are not improving or if you have a fever, or other concerning symptoms, re evaluate.    Continue flonase nasal, used daily it will be more effective

## 2024-05-17 NOTE — ED PROVIDER NOTES
Partners: Male     Birth control/protection: None, Surgical     Comment: Hysterectomy     Social Determinants of Health     Financial Resource Strain: Low Risk  (7/31/2023)    Overall Financial Resource Strain (CARDIA)     Difficulty of Paying Living Expenses: Not hard at all   Transportation Needs: Unknown (7/31/2023)    PRAPARE - Transportation     Lack of Transportation (Non-Medical): No   Housing Stability: Unknown (7/31/2023)    Housing Stability Vital Sign     Unstable Housing in the Last Year: No           PHYSICAL EXAM    (up to 7 for level 4, 8 or more for level 5)     ED Triage Vitals [05/17/24 0919]   BP Temp Temp Source Pulse Respirations SpO2 Height Weight - Scale   133/87 98.1 °F (36.7 °C) Oral 85 18 95 % 1.651 m (5' 5\") 104.3 kg (230 lb)       Body mass index is 38.27 kg/m².    Physical Exam  Constitutional:       General: She is not in acute distress.     Appearance: She is well-developed. She is obese. She is not ill-appearing.   HENT:      Right Ear: Tympanic membrane and ear canal normal.      Left Ear: Tympanic membrane and ear canal normal.      Ears:      Comments: Scarring noted to the left TM, chronic appearing opening in the TM on the left, no drainage from the site     Mouth/Throat:      Mouth: Mucous membranes are moist. No oral lesions.      Pharynx: Oropharynx is clear. No pharyngeal swelling, oropharyngeal exudate, posterior oropharyngeal erythema or uvula swelling.      Comments: Tonsils absent  Cardiovascular:      Rate and Rhythm: Normal rate and regular rhythm.   Pulmonary:      Effort: Pulmonary effort is normal. No respiratory distress.      Breath sounds: Normal breath sounds. No stridor. No wheezing, rhonchi or rales.   Chest:      Chest wall: No tenderness.   Musculoskeletal:      Cervical back: Normal range of motion and neck supple.   Lymphadenopathy:      Cervical: No cervical adenopathy.   Skin:     General: Skin is warm and dry.   Neurological:      Mental Status: She

## 2024-05-19 LAB
BACTERIA SPEC CULT: NORMAL
SERVICE CMNT-IMP: NORMAL

## 2024-06-24 ENCOUNTER — OFFICE VISIT (OUTPATIENT)
Age: 49
End: 2024-06-24
Payer: COMMERCIAL

## 2024-06-24 VITALS
DIASTOLIC BLOOD PRESSURE: 80 MMHG | TEMPERATURE: 98.2 F | BODY MASS INDEX: 38.32 KG/M2 | HEIGHT: 65 IN | HEART RATE: 78 BPM | OXYGEN SATURATION: 96 % | WEIGHT: 230 LBS | SYSTOLIC BLOOD PRESSURE: 139 MMHG

## 2024-06-24 DIAGNOSIS — M79.675 PAIN IN TOES OF BOTH FEET: ICD-10-CM

## 2024-06-24 DIAGNOSIS — L60.0 INGROWING NAIL: Primary | ICD-10-CM

## 2024-06-24 DIAGNOSIS — M79.674 PAIN IN TOES OF BOTH FEET: ICD-10-CM

## 2024-06-24 PROCEDURE — 99213 OFFICE O/P EST LOW 20 MIN: CPT | Performed by: PODIATRIST

## 2024-06-24 NOTE — PROGRESS NOTES
Chief Complaint   Patient presents with    Follow-up     /80 (Site: Left Upper Arm, Position: Sitting, Cuff Size: Large Adult)   Pulse 78   Temp 98.2 °F (36.8 °C) (Temporal)   Ht 1.651 m (5' 5\")   Wt 104.3 kg (230 lb)   SpO2 96%   BMI 38.27 kg/m²     1. Have you been to the ER, urgent care clinic since your last visit?  Hospitalized since your last visit?No    2. Have you seen or consulted any other health care providers outside of the [facility] since your last visit?  Include any pap smears or colon screening. No

## 2024-07-01 ASSESSMENT — ENCOUNTER SYMPTOMS
VOMITING: 0
SHORTNESS OF BREATH: 0
DIARRHEA: 0
ABDOMINAL PAIN: 0

## 2024-07-01 NOTE — PROGRESS NOTES
[facility]         Patient Name: Marzena Roe    : 1975    Visit Date: 2024    Office Visit Note      Subjective:         Patient is a 48 y.o. female who is being seen in office follow up visit for pain to bilateral hallux.  Patient had bilateral partial nail avulsion with matrixectomy and she was to evaluate the surgical site.    Past Medical History:   Diagnosis Date    Allergies     Asthma     Callus     Fallen arches     Hearing loss     Ingrown toenail     Obesity     Plantar fasciitis     Vertigo      Past Surgical History:   Procedure Laterality Date    ARM SURGERY      HYSTERECTOMY (CERVIX STATUS UNKNOWN)      HYSTERECTOMY, VAGINAL      TONSILLECTOMY         Family History   Problem Relation Age of Onset    Thyroid Disease Mother     High Cholesterol Mother     Colon Polyps Mother     Anemia Mother     Hearing Loss Mother     Diabetes Father     Hypertension Father     Heart Attack Father     Prostate Cancer Father     Cancer Father     Gout Father     Thyroid Disease Sister     Gout Sister     Thyroid Disease Paternal Grandfather     Thyroid Disease Niece     Diabetes Paternal Grandmother     Stroke Paternal Grandmother     High Blood Pressure Brother       Social History     Tobacco Use    Smoking status: Former     Current packs/day: 0.03     Average packs/day: (0.2 ttl pk-yrs)     Types: Cigarettes    Smokeless tobacco: Never   Substance Use Topics    Alcohol use: Not Currently     Comment: rarely     No Known Allergies  Prior to Admission medications    Medication Sig Start Date End Date Taking? Authorizing Provider   ciclopirox (LOPROX) 0.77 % cream Apply topically 2 times daily to affected toenails. 3/18/24  Yes Pipo Mendoza DPM   Semaglutide-Weight Management (WEGOVY) 2.4 MG/0.75ML SOAJ SC injection Inject 2.4 mg into the skin every 7 days 3/15/24  Yes Aye Gomez MD   fluticasone (FLONASE) 50 MCG/ACT nasal spray 2 sprays by Each Nostril route

## 2024-09-10 ENCOUNTER — OFFICE VISIT (OUTPATIENT)
Age: 49
End: 2024-09-10
Payer: COMMERCIAL

## 2024-09-10 ENCOUNTER — HOSPITAL ENCOUNTER (OUTPATIENT)
Facility: HOSPITAL | Age: 49
Discharge: HOME | End: 2024-09-13
Payer: COMMERCIAL

## 2024-09-10 VITALS
BODY MASS INDEX: 37.99 KG/M2 | SYSTOLIC BLOOD PRESSURE: 132 MMHG | HEIGHT: 65 IN | WEIGHT: 228 LBS | DIASTOLIC BLOOD PRESSURE: 74 MMHG

## 2024-09-10 DIAGNOSIS — N95.1 SYMPTOMATIC MENOPAUSAL OR FEMALE CLIMACTERIC STATES: ICD-10-CM

## 2024-09-10 DIAGNOSIS — Z90.710 HISTORY OF HYSTERECTOMY: ICD-10-CM

## 2024-09-10 DIAGNOSIS — G89.29 NECK PAIN, CHRONIC: ICD-10-CM

## 2024-09-10 DIAGNOSIS — Z12.72 SCREENING FOR VAGINAL CANCER: Primary | ICD-10-CM

## 2024-09-10 DIAGNOSIS — Z01.419 GYNECOLOGIC EXAM NORMAL: ICD-10-CM

## 2024-09-10 DIAGNOSIS — N62 LARGE BREASTS: ICD-10-CM

## 2024-09-10 DIAGNOSIS — Z12.31 VISIT FOR SCREENING MAMMOGRAM: ICD-10-CM

## 2024-09-10 DIAGNOSIS — M54.2 NECK PAIN, CHRONIC: ICD-10-CM

## 2024-09-10 DIAGNOSIS — G89.29 CHRONIC UPPER BACK PAIN: ICD-10-CM

## 2024-09-10 DIAGNOSIS — N95.1 MENOPAUSAL SYNDROME: ICD-10-CM

## 2024-09-10 DIAGNOSIS — M54.9 CHRONIC UPPER BACK PAIN: ICD-10-CM

## 2024-09-10 PROCEDURE — 77063 BREAST TOMOSYNTHESIS BI: CPT

## 2024-09-10 PROCEDURE — 99396 PREV VISIT EST AGE 40-64: CPT | Performed by: OBSTETRICS & GYNECOLOGY

## 2024-09-10 RX ORDER — ESTRADIOL 2 MG/1
2 TABLET ORAL DAILY
Qty: 90 TABLET | Refills: 3 | Status: SHIPPED | OUTPATIENT
Start: 2024-09-10

## 2024-09-10 ASSESSMENT — ENCOUNTER SYMPTOMS
RESPIRATORY NEGATIVE: 1
GASTROINTESTINAL NEGATIVE: 1

## 2024-09-16 LAB
., LABCORP: NORMAL
CYTOLOGIST CVX/VAG CYTO: NORMAL
CYTOLOGY CVX/VAG DOC CYTO: NORMAL
CYTOLOGY CVX/VAG DOC THIN PREP: NORMAL
DX ICD CODE: NORMAL
Lab: NORMAL
OTHER STN SPEC: NORMAL
PATHOLOGIST CVX/VAG CYTO: NORMAL
STAT OF ADQ CVX/VAG CYTO-IMP: NORMAL

## 2024-09-23 RX ORDER — CICLOPIROX OLAMINE 7.7 MG/G
CREAM TOPICAL
Qty: 30 G | Refills: 2 | OUTPATIENT
Start: 2024-09-23

## 2024-10-02 ENCOUNTER — TELEPHONE (OUTPATIENT)
Facility: CLINIC | Age: 49
End: 2024-10-02

## 2024-10-02 NOTE — TELEPHONE ENCOUNTER
Patient called in requesting a refill on her Wegovy. She would like for it to be sent to the [facility] on the [place].

## 2024-10-03 ENCOUNTER — PATIENT MESSAGE (OUTPATIENT)
Facility: CLINIC | Age: 49
End: 2024-10-03

## 2024-10-03 DIAGNOSIS — E66.813 CLASS 3 SEVERE OBESITY DUE TO EXCESS CALORIES WITH SERIOUS COMORBIDITY AND BODY MASS INDEX (BMI) OF 40.0 TO 44.9 IN ADULT: ICD-10-CM

## 2024-10-03 DIAGNOSIS — E66.01 CLASS 3 SEVERE OBESITY DUE TO EXCESS CALORIES WITH SERIOUS COMORBIDITY AND BODY MASS INDEX (BMI) OF 40.0 TO 44.9 IN ADULT: ICD-10-CM

## 2024-10-03 RX ORDER — SEMAGLUTIDE 2.4 MG/.75ML
2.4 INJECTION, SOLUTION SUBCUTANEOUS
Qty: 9 ML | Refills: 1 | Status: SHIPPED | OUTPATIENT
Start: 2024-10-03

## 2024-10-10 SDOH — ECONOMIC STABILITY: FOOD INSECURITY: WITHIN THE PAST 12 MONTHS, THE FOOD YOU BOUGHT JUST DIDN'T LAST AND YOU DIDN'T HAVE MONEY TO GET MORE.: NEVER TRUE

## 2024-10-10 SDOH — ECONOMIC STABILITY: FOOD INSECURITY: WITHIN THE PAST 12 MONTHS, YOU WORRIED THAT YOUR FOOD WOULD RUN OUT BEFORE YOU GOT MONEY TO BUY MORE.: NEVER TRUE

## 2024-10-10 SDOH — ECONOMIC STABILITY: TRANSPORTATION INSECURITY
IN THE PAST 12 MONTHS, HAS LACK OF TRANSPORTATION KEPT YOU FROM MEETINGS, WORK, OR FROM GETTING THINGS NEEDED FOR DAILY LIVING?: NO

## 2024-10-10 SDOH — ECONOMIC STABILITY: INCOME INSECURITY: HOW HARD IS IT FOR YOU TO PAY FOR THE VERY BASICS LIKE FOOD, HOUSING, MEDICAL CARE, AND HEATING?: NOT VERY HARD

## 2024-10-11 ENCOUNTER — OFFICE VISIT (OUTPATIENT)
Facility: CLINIC | Age: 49
End: 2024-10-11
Payer: COMMERCIAL

## 2024-10-11 VITALS
WEIGHT: 227 LBS | OXYGEN SATURATION: 97 % | DIASTOLIC BLOOD PRESSURE: 84 MMHG | SYSTOLIC BLOOD PRESSURE: 132 MMHG | HEART RATE: 71 BPM | HEIGHT: 65 IN | BODY MASS INDEX: 37.82 KG/M2 | TEMPERATURE: 97.4 F

## 2024-10-11 DIAGNOSIS — E78.2 MIXED HYPERLIPIDEMIA: ICD-10-CM

## 2024-10-11 DIAGNOSIS — E66.01 CLASS 3 SEVERE OBESITY DUE TO EXCESS CALORIES WITH SERIOUS COMORBIDITY AND BODY MASS INDEX (BMI) OF 40.0 TO 44.9 IN ADULT: Primary | ICD-10-CM

## 2024-10-11 DIAGNOSIS — E66.813 CLASS 3 SEVERE OBESITY DUE TO EXCESS CALORIES WITH SERIOUS COMORBIDITY AND BODY MASS INDEX (BMI) OF 40.0 TO 44.9 IN ADULT: Primary | ICD-10-CM

## 2024-10-11 PROCEDURE — 99214 OFFICE O/P EST MOD 30 MIN: CPT | Performed by: FAMILY MEDICINE

## 2024-10-11 RX ORDER — TIRZEPATIDE 2.5 MG/.5ML
2 INJECTION, SOLUTION SUBCUTANEOUS
Qty: 2 ML | Refills: 0 | Status: SHIPPED | OUTPATIENT
Start: 2024-10-11

## 2024-10-11 NOTE — PROGRESS NOTES
Chief Complaint   Patient presents with    Discuss Medications     Wegovy     \"Have you been to the ER, urgent care clinic since your last visit?  Hospitalized since your last visit?\"    NO    “Have you seen or consulted any other health care providers outside of [facility] since your last visit?”    NO     /84 (Site: Left Upper Arm, Position: Sitting)   Pulse 71   Temp 97.4 °F (36.3 °C) (Temporal)   Ht 1.651 m (5' 5\")   Wt 103 kg (227 lb)   SpO2 97%   BMI 37.77 kg/m²    No data recorded   [facility] Social Determinants Of Health (Sdoh) Screening Questionnaire       Question 10/10/2024  8:14 AM EDT - Filed by Patient    How hard is it for you to pay for the very basics like food, housing, medical care, and heating? Not very hard    Within the past 12 months, you worried that your food would run out before you got the money to buy more. Never true    Within the past 12 months, the food you bought just didn't last and you didn't have money to get more. Never true    In the past 12 months, has lack of transportation kept you from meetings, work, or from getting things needed for daily living? No    At any time in the past 12 months, were you homeless or living in a shelter (including now)? No    Would you like resources for any of these topics? No, thank you                     Click Here for Release of Records Request     Identified Patient with 2 Patient Identifiers-Name and

## 2024-10-11 NOTE — PROGRESS NOTES
[facility]      HPI: Pt is a 49 y.o. female who presents for follow-up.    Obesity: She has been taking Wegovy without side effects. She walks for exercise but has not been able to do as much lately because her work schedule has been busy. She is following a high protein diet and working on portion control. She is working on cutting back carbs. She started the Wegovy in 8/2023 and was 258lbs at that time. She is now down to 227, which is an 8% weight loss.     HLD: She is working on diet and exercise for this, as above. Her cholesterol earlier this year was significantly better than the year before and she has an appt to have this checked at her physical in a few weeks.       Past Medical History:   Diagnosis Date    Allergies     Asthma     Callus     Fallen arches     Hearing loss     Ingrown toenail     Obesity     Plantar fasciitis     Vertigo        Family History   Problem Relation Age of Onset    Thyroid Disease Mother     High Cholesterol Mother     Colon Polyps Mother     Anemia Mother     Hearing Loss Mother     Diabetes Father     Hypertension Father     Heart Attack Father     Prostate Cancer Father     Cancer Father     Gout Father     Thyroid Disease Sister     Gout Sister     Thyroid Disease Paternal Grandfather     Thyroid Disease Niece     Diabetes Paternal Grandmother     Stroke Paternal Grandmother     High Blood Pressure Brother        Social History     Tobacco Use    Smoking status: Former     Current packs/day: 0.03     Average packs/day: (0.2 ttl pk-yrs)     Types: Cigarettes    Smokeless tobacco: Never   Vaping Use    Vaping status: Never Used   Substance Use Topics    Alcohol use: Not Currently     Comment: rarely    Drug use: Never       ROS:  Per HPI    PE:  /84 (Site: Left Upper Arm, Position: Sitting)   Pulse 71   Temp 97.4 °F (36.3 °C) (Temporal)   Ht 1.651 m (5' 5\")   Wt 103 kg (227 lb)   SpO2 97%   BMI 37.77 kg/m²   Gen: Pt sitting in chair, in NAD  Head:

## 2024-10-23 ENCOUNTER — OFFICE VISIT (OUTPATIENT)
Dept: PRIMARY CARE CLINIC | Facility: CLINIC | Age: 49
End: 2024-10-23

## 2024-10-23 VITALS
TEMPERATURE: 98.1 F | HEIGHT: 65 IN | RESPIRATION RATE: 16 BRPM | DIASTOLIC BLOOD PRESSURE: 87 MMHG | WEIGHT: 225 LBS | OXYGEN SATURATION: 99 % | HEART RATE: 76 BPM | SYSTOLIC BLOOD PRESSURE: 164 MMHG | BODY MASS INDEX: 37.49 KG/M2

## 2024-10-23 DIAGNOSIS — Z23 ENCOUNTER FOR IMMUNIZATION: Primary | ICD-10-CM

## 2024-10-23 DIAGNOSIS — Z76.89 ENCOUNTER TO ESTABLISH CARE: ICD-10-CM

## 2024-10-23 DIAGNOSIS — B30.9 VIRAL CONJUNCTIVITIS OF LEFT EYE: ICD-10-CM

## 2024-10-23 DIAGNOSIS — R03.0 ELEVATED BLOOD PRESSURE READING IN OFFICE WITHOUT DIAGNOSIS OF HYPERTENSION: ICD-10-CM

## 2024-10-23 DIAGNOSIS — E66.812 OBESITY, CLASS II, BMI 35-39.9: ICD-10-CM

## 2024-10-23 PROBLEM — L30.9 ECZEMA: Status: ACTIVE | Noted: 2024-10-23

## 2024-10-23 PROBLEM — K63.5 POLYP OF COLON: Status: ACTIVE | Noted: 2023-08-09

## 2024-10-23 PROBLEM — E66.01 CLASS 3 SEVERE OBESITY DUE TO EXCESS CALORIES WITH SERIOUS COMORBIDITY IN ADULT: Status: RESOLVED | Noted: 2023-07-31 | Resolved: 2024-10-23

## 2024-10-23 PROBLEM — E66.813 CLASS 3 SEVERE OBESITY DUE TO EXCESS CALORIES WITH SERIOUS COMORBIDITY IN ADULT: Status: RESOLVED | Noted: 2023-07-31 | Resolved: 2024-10-23

## 2024-10-23 PROBLEM — U07.1 COVID-19: Status: RESOLVED | Noted: 2021-12-23 | Resolved: 2024-10-23

## 2024-10-23 RX ORDER — AZELASTINE HYDROCHLORIDE 0.5 MG/ML
1 SOLUTION/ DROPS OPHTHALMIC 2 TIMES DAILY
Qty: 6 ML | Refills: 0 | Status: SHIPPED | OUTPATIENT
Start: 2024-10-23 | End: 2024-10-30

## 2024-10-23 ASSESSMENT — ENCOUNTER SYMPTOMS
EYE REDNESS: 1
EYE DISCHARGE: 1
DIARRHEA: 0
CONSTIPATION: 0
SHORTNESS OF BREATH: 0
EYE ITCHING: 1
ABDOMINAL PAIN: 0

## 2024-10-23 NOTE — PROGRESS NOTES
PCP: Aye Gomez MD    CC: New Patient (Establish / Left eye swollen)      Subjective      Marzena Roe is a 49 y.o. female,Established patient, who presents to establish care and to discuss her L eye. She was previously seen by Dr. Gomez in the last few months.    L eye:  Was doing fine at work on Monday, and got home on Monday night and woke up yesterday morning with with clear discharge. It does itch. Has never had this happen before. No one at home with similar symptoms. Does feel itchy and gritty. Has been using some visine for symptoms relief with minimal      Review of Systems   Constitutional:  Negative for chills, fever and unexpected weight change.   HENT: Negative.     Eyes:  Positive for discharge, redness, itching and visual disturbance.   Respiratory:  Negative for shortness of breath.    Gastrointestinal:  Negative for abdominal pain, constipation and diarrhea.   Genitourinary: Negative.    Musculoskeletal: Negative.    Skin:  Negative for rash.   Neurological:  Negative for dizziness and headaches.   Psychiatric/Behavioral:  Negative for dysphoric mood and suicidal ideas.          Objective      Vitals:    10/23/24 0702 10/23/24 0735   BP: (!) 151/88 (!) 164/87   Site: Left Upper Arm Left Upper Arm   Position: Sitting Sitting   Cuff Size: Medium Adult Medium Adult   Pulse: 76    Resp: 16    Temp: 98.1 °F (36.7 °C)    TempSrc: Oral    SpO2: 99%    Weight: 102.1 kg (225 lb)    Height: 1.651 m (5' 5\")       Body mass index is 37.44 kg/m².       Physical Exam  HENT:      Head: Normocephalic.      Right Ear: External ear normal.      Left Ear: External ear normal.      Nose: Nose normal.   Eyes:      General:         Left eye: Discharge present.     Extraocular Movements: Extraocular movements intact.      Conjunctiva/sclera:      Left eye: Left conjunctiva is injected.      Pupils: Pupils are equal, round, and reactive to light.   Cardiovascular:      Rate and Rhythm: Normal

## 2024-10-23 NOTE — PROGRESS NOTES
\"Have you been to the ER, urgent care clinic since your last visit?  Hospitalized since your last visit?\"    NO    “Have you seen or consulted any other health care providers outside of [facility] since your last visit?”    NO

## 2024-10-23 NOTE — ASSESSMENT & PLAN NOTE
- Previously on Wegovy, now on Zepbound 2.5 mg weekly and tolerating well. She is down 33 lbs  - Continue weeks 3 and 4 of Zepbound 2.5 mg weekly, then increase to 5 mg weekly  - CMP, lipid panel, and A1c ordered today  - Continue diet and exercise modifications

## 2024-10-24 ENCOUNTER — PATIENT MESSAGE (OUTPATIENT)
Dept: PRIMARY CARE CLINIC | Facility: CLINIC | Age: 49
End: 2024-10-24

## 2024-10-24 DIAGNOSIS — E66.812 CLASS 2 OBESITY WITHOUT SERIOUS COMORBIDITY WITH BODY MASS INDEX (BMI) OF 37.0 TO 37.9 IN ADULT, UNSPECIFIED OBESITY TYPE: ICD-10-CM

## 2024-10-24 LAB
ALBUMIN SERPL-MCNC: 4 G/DL (ref 3.9–4.9)
ALP SERPL-CCNC: 64 IU/L (ref 44–121)
ALT SERPL-CCNC: 12 IU/L (ref 0–32)
AST SERPL-CCNC: 13 IU/L (ref 0–40)
BILIRUB SERPL-MCNC: 0.3 MG/DL (ref 0–1.2)
BUN SERPL-MCNC: 17 MG/DL (ref 6–24)
BUN/CREAT SERPL: 19 (ref 9–23)
CALCIUM SERPL-MCNC: 9.7 MG/DL (ref 8.7–10.2)
CHLORIDE SERPL-SCNC: 103 MMOL/L (ref 96–106)
CHOLEST SERPL-MCNC: 240 MG/DL (ref 100–199)
CO2 SERPL-SCNC: 25 MMOL/L (ref 20–29)
CREAT SERPL-MCNC: 0.91 MG/DL (ref 0.57–1)
EGFRCR SERPLBLD CKD-EPI 2021: 77 ML/MIN/1.73
GLOBULIN SER CALC-MCNC: 2.6 G/DL (ref 1.5–4.5)
GLUCOSE SERPL-MCNC: 88 MG/DL (ref 70–99)
HBA1C MFR BLD: 5.4 % (ref 4.8–5.6)
HDLC SERPL-MCNC: 74 MG/DL
LDLC SERPL CALC-MCNC: 144 MG/DL (ref 0–99)
POTASSIUM SERPL-SCNC: 4.4 MMOL/L (ref 3.5–5.2)
PROT SERPL-MCNC: 6.6 G/DL (ref 6–8.5)
SODIUM SERPL-SCNC: 141 MMOL/L (ref 134–144)
TRIGL SERPL-MCNC: 127 MG/DL (ref 0–149)
VLDLC SERPL CALC-MCNC: 22 MG/DL (ref 5–40)

## 2024-10-28 RX ORDER — TIRZEPATIDE 5 MG/.5ML
5 INJECTION, SOLUTION SUBCUTANEOUS WEEKLY
Qty: 2 ML | Refills: 0 | Status: SHIPPED | OUTPATIENT
Start: 2024-10-28

## 2024-10-28 RX ORDER — TIRZEPATIDE 5 MG/.5ML
5 INJECTION, SOLUTION SUBCUTANEOUS WEEKLY
Qty: 2 ML | Refills: 0 | Status: SHIPPED | OUTPATIENT
Start: 2024-10-28 | End: 2024-10-28

## 2024-10-29 DIAGNOSIS — B30.9 VIRAL CONJUNCTIVITIS OF LEFT EYE: ICD-10-CM

## 2024-10-29 RX ORDER — AZELASTINE HYDROCHLORIDE 0.5 MG/ML
SOLUTION/ DROPS OPHTHALMIC
Qty: 18 ML | Refills: 1 | OUTPATIENT
Start: 2024-10-29

## 2024-10-29 RX ORDER — AZELASTINE HYDROCHLORIDE 0.5 MG/ML
1 SOLUTION/ DROPS OPHTHALMIC 2 TIMES DAILY
Qty: 18 ML | Refills: 1 | Status: SHIPPED | OUTPATIENT
Start: 2024-10-29 | End: 2024-11-05

## 2024-11-04 ENCOUNTER — OFFICE VISIT (OUTPATIENT)
Dept: PRIMARY CARE CLINIC | Facility: CLINIC | Age: 49
End: 2024-11-04
Payer: COMMERCIAL

## 2024-11-04 VITALS
BODY MASS INDEX: 37.75 KG/M2 | WEIGHT: 226.6 LBS | DIASTOLIC BLOOD PRESSURE: 83 MMHG | HEART RATE: 68 BPM | SYSTOLIC BLOOD PRESSURE: 144 MMHG | OXYGEN SATURATION: 95 % | TEMPERATURE: 97.8 F | HEIGHT: 65 IN

## 2024-11-04 DIAGNOSIS — H10.45 OTHER CHRONIC ALLERGIC CONJUNCTIVITIS OF LEFT EYE: Primary | ICD-10-CM

## 2024-11-04 DIAGNOSIS — B00.1 COLD SORE: ICD-10-CM

## 2024-11-04 DIAGNOSIS — J06.9 VIRAL URI WITH COUGH: ICD-10-CM

## 2024-11-04 PROCEDURE — 99214 OFFICE O/P EST MOD 30 MIN: CPT | Performed by: FAMILY MEDICINE

## 2024-11-04 RX ORDER — FLUTICASONE PROPIONATE 50 MCG
2 SPRAY, SUSPENSION (ML) NASAL DAILY PRN
Qty: 48 G | Refills: 3 | Status: SHIPPED | OUTPATIENT
Start: 2024-11-04

## 2024-11-04 RX ORDER — GUAIFENESIN 600 MG/1
1200 TABLET, EXTENDED RELEASE ORAL 2 TIMES DAILY
Qty: 56 TABLET | Refills: 0 | Status: SHIPPED | OUTPATIENT
Start: 2024-11-04 | End: 2024-11-18

## 2024-11-04 RX ORDER — VALACYCLOVIR HYDROCHLORIDE 1 G/1
1000 TABLET, FILM COATED ORAL 2 TIMES DAILY
Qty: 20 TABLET | Refills: 0 | Status: SHIPPED | OUTPATIENT
Start: 2024-11-04 | End: 2024-11-11

## 2024-11-04 ASSESSMENT — ENCOUNTER SYMPTOMS
ABDOMINAL PAIN: 0
SHORTNESS OF BREATH: 0
COUGH: 1
DIARRHEA: 0
CONSTIPATION: 0

## 2024-11-04 NOTE — PROGRESS NOTES
PCP: Mireya Parrish DO    CC: Other (Eye still bothering, head congestion, bad coughing. No fever or chills nor body aches. Took sudafed claritan and nasal spray and her eye drops prescribed days ago. )      Subjective      Marzena Roe is a 49 y.o. female,Established patient, who presents for sick symptoms.       Patient presents with cough, congestion, headache, and left eye pain. She also noticed a cold sore this morning on her bottom lip. Ongoing since this past Friday. She has been taking Sudafed, Claritin, and using a nasal spray.  She is also been using her azelastine eyedrops. Denies any sick contacts at home.  Denies fevers, chills, bodyaches, nausea, or vomiting.      Review of Systems   Constitutional:  Negative for chills, fever and unexpected weight change.   HENT:  Positive for congestion.    Eyes:  Negative for visual disturbance.   Respiratory:  Positive for cough. Negative for shortness of breath.    Gastrointestinal:  Negative for abdominal pain, constipation and diarrhea.   Genitourinary: Negative.    Musculoskeletal: Negative.    Skin:  Negative for rash.   Neurological:  Positive for headaches. Negative for dizziness.   Psychiatric/Behavioral:  Negative for dysphoric mood and suicidal ideas.          Objective      Vitals:    11/04/24 0726   BP: (!) 144/83   Site: Left Upper Arm   Position: Sitting   Cuff Size: Large Adult   Pulse: 68   Temp: 97.8 °F (36.6 °C)   TempSrc: Oral   SpO2: 95%   Weight: 102.8 kg (226 lb 9.6 oz)   Height: 1.651 m (5' 5\")      Body mass index is 37.71 kg/m².       Physical Exam  HENT:      Head: Normocephalic.      Right Ear: External ear normal.      Left Ear: External ear normal.      Nose: Nose normal.      Mouth/Throat:      Lips: Lesions present.        Comments: Cold sore on L lower lip  Eyes:      Extraocular Movements: Extraocular movements intact.      Conjunctiva/sclera:      Left eye: Left conjunctiva is injected.      Pupils: Pupils are equal,

## 2024-12-24 DIAGNOSIS — E66.812 CLASS 2 OBESITY WITHOUT SERIOUS COMORBIDITY WITH BODY MASS INDEX (BMI) OF 37.0 TO 37.9 IN ADULT, UNSPECIFIED OBESITY TYPE: Primary | ICD-10-CM

## 2024-12-24 NOTE — PROGRESS NOTES
Zepbound dose increased to 10 mg weekly. Prescribed 2 months worth at this dose. Patient needs follow up at earliest convenience to track progress.

## 2025-01-26 ENCOUNTER — PATIENT MESSAGE (OUTPATIENT)
Dept: PRIMARY CARE CLINIC | Facility: CLINIC | Age: 50
End: 2025-01-26

## 2025-01-26 DIAGNOSIS — E66.812 CLASS 2 OBESITY WITHOUT SERIOUS COMORBIDITY WITH BODY MASS INDEX (BMI) OF 37.0 TO 37.9 IN ADULT, UNSPECIFIED OBESITY TYPE: ICD-10-CM

## 2025-02-06 ENCOUNTER — OFFICE VISIT (OUTPATIENT)
Dept: PRIMARY CARE CLINIC | Facility: CLINIC | Age: 50
End: 2025-02-06
Payer: COMMERCIAL

## 2025-02-06 VITALS
TEMPERATURE: 98.3 F | BODY MASS INDEX: 37.95 KG/M2 | DIASTOLIC BLOOD PRESSURE: 94 MMHG | WEIGHT: 227.8 LBS | OXYGEN SATURATION: 98 % | HEIGHT: 65 IN | RESPIRATION RATE: 20 BRPM | SYSTOLIC BLOOD PRESSURE: 161 MMHG | HEART RATE: 76 BPM

## 2025-02-06 DIAGNOSIS — A08.4 VIRAL GASTROENTERITIS: Primary | ICD-10-CM

## 2025-02-06 PROCEDURE — 99213 OFFICE O/P EST LOW 20 MIN: CPT | Performed by: FAMILY MEDICINE

## 2025-02-06 RX ORDER — ONDANSETRON 4 MG/1
4 TABLET, ORALLY DISINTEGRATING ORAL 3 TIMES DAILY PRN
Qty: 21 TABLET | Refills: 0 | Status: SHIPPED | OUTPATIENT
Start: 2025-02-06

## 2025-02-06 SDOH — ECONOMIC STABILITY: FOOD INSECURITY: WITHIN THE PAST 12 MONTHS, THE FOOD YOU BOUGHT JUST DIDN'T LAST AND YOU DIDN'T HAVE MONEY TO GET MORE.: NEVER TRUE

## 2025-02-06 SDOH — ECONOMIC STABILITY: TRANSPORTATION INSECURITY
IN THE PAST 12 MONTHS, HAS THE LACK OF TRANSPORTATION KEPT YOU FROM MEDICAL APPOINTMENTS OR FROM GETTING MEDICATIONS?: NO

## 2025-02-06 SDOH — ECONOMIC STABILITY: FOOD INSECURITY: WITHIN THE PAST 12 MONTHS, YOU WORRIED THAT YOUR FOOD WOULD RUN OUT BEFORE YOU GOT MONEY TO BUY MORE.: NEVER TRUE

## 2025-02-06 SDOH — ECONOMIC STABILITY: INCOME INSECURITY: IN THE LAST 12 MONTHS, WAS THERE A TIME WHEN YOU WERE NOT ABLE TO PAY THE MORTGAGE OR RENT ON TIME?: NO

## 2025-02-06 ASSESSMENT — PATIENT HEALTH QUESTIONNAIRE - PHQ9
SUM OF ALL RESPONSES TO PHQ9 QUESTIONS 1 & 2: 0
SUM OF ALL RESPONSES TO PHQ QUESTIONS 1-9: 0
2. FEELING DOWN, DEPRESSED OR HOPELESS: NOT AT ALL
SUM OF ALL RESPONSES TO PHQ QUESTIONS 1-9: 0
SUM OF ALL RESPONSES TO PHQ QUESTIONS 1-9: 0
1. LITTLE INTEREST OR PLEASURE IN DOING THINGS: NOT AT ALL
SUM OF ALL RESPONSES TO PHQ QUESTIONS 1-9: 0

## 2025-02-06 ASSESSMENT — ENCOUNTER SYMPTOMS
VOMITING: 0
NAUSEA: 1
CONSTIPATION: 0
DIARRHEA: 0
ABDOMINAL PAIN: 1
SHORTNESS OF BREATH: 0

## 2025-02-06 NOTE — PROGRESS NOTES
PCP: Mireya Parrish DO    CC: Abdominal Pain      Subjective      Marzena Roe is a 49 y.o. female,Established patient, who presents for abdominal pain.     Abdominal pain and diarrhea since Monday. Patient went with her family (19 in total) to dinner on Sunday to celebrate her father's birthday. Diarrhea improved on Tuesday. Went to work on Tuesday, but missed out Wednesday. Now having a sharp pain in the left upper quadrant. 4 of the 19 who went to dinner had similar symptoms. She has not eaten anything greasy or heavy. Soup and crackers were okay on her stomach. No vomiting, fevers, or chills. No additional sick contacts.    Review of Systems   Constitutional:  Negative for chills, fever and unexpected weight change.   HENT: Negative.     Eyes:  Negative for visual disturbance.   Respiratory:  Negative for shortness of breath.    Gastrointestinal:  Positive for abdominal pain and nausea. Negative for constipation, diarrhea and vomiting.   Genitourinary: Negative.    Musculoskeletal: Negative.    Skin:  Negative for rash.   Neurological:  Negative for dizziness and headaches.   Psychiatric/Behavioral:  Negative for dysphoric mood and suicidal ideas.          Objective      Vitals:    02/06/25 1552 02/06/25 1556 02/06/25 1617   BP: (!) 184/100 (!) 161/94    Site: Left Upper Arm Left Upper Arm    Position: Sitting Sitting    Cuff Size: Large Adult Large Adult    Pulse: (!) 104  76   Resp: 20     Temp: 98.3 °F (36.8 °C)     TempSrc: Temporal     SpO2: 98%     Weight: 103.3 kg (227 lb 12.8 oz)     Height: 1.651 m (5' 5\")        Body mass index is 37.91 kg/m².       PHQ-9 Total Score: 0 (2/6/2025  3:51 PM)      Physical Exam  HENT:      Head: Normocephalic.      Right Ear: External ear normal.      Left Ear: External ear normal.      Nose: Nose normal.      Mouth/Throat:      Mouth: Mucous membranes are moist.   Eyes:      Extraocular Movements: Extraocular movements intact.      Conjunctiva/sclera:

## 2025-02-12 ENCOUNTER — HOSPITAL ENCOUNTER (EMERGENCY)
Facility: HOSPITAL | Age: 50
Discharge: HOME | End: 2025-02-12
Attending: EMERGENCY MEDICINE
Payer: COMMERCIAL

## 2025-02-12 VITALS
HEART RATE: 86 BPM | RESPIRATION RATE: 18 BRPM | WEIGHT: 220 LBS | TEMPERATURE: 98.2 F | SYSTOLIC BLOOD PRESSURE: 166 MMHG | HEIGHT: 65 IN | DIASTOLIC BLOOD PRESSURE: 90 MMHG | OXYGEN SATURATION: 99 % | BODY MASS INDEX: 36.65 KG/M2

## 2025-02-12 DIAGNOSIS — U07.1 COVID: Primary | ICD-10-CM

## 2025-02-12 LAB
FLUAV RNA SPEC QL NAA+PROBE: NOT DETECTED
FLUBV RNA SPEC QL NAA+PROBE: NOT DETECTED
SARS-COV-2 RNA RESP QL NAA+PROBE: DETECTED
SOURCE: ABNORMAL

## 2025-02-12 PROCEDURE — 99283 EMERGENCY DEPT VISIT LOW MDM: CPT

## 2025-02-12 PROCEDURE — 87636 SARSCOV2 & INF A&B AMP PRB: CPT

## 2025-02-12 PROCEDURE — 6370000000 HC RX 637 (ALT 250 FOR IP)

## 2025-02-12 RX ORDER — GUAIFENESIN 600 MG/1
600 TABLET, EXTENDED RELEASE ORAL 2 TIMES DAILY
Qty: 30 TABLET | Refills: 0 | Status: SHIPPED | OUTPATIENT
Start: 2025-02-12 | End: 2025-02-27

## 2025-02-12 RX ORDER — BENZONATATE 100 MG/1
100 CAPSULE ORAL 2 TIMES DAILY PRN
Qty: 20 CAPSULE | Refills: 0 | Status: SHIPPED | OUTPATIENT
Start: 2025-02-12 | End: 2025-02-22

## 2025-02-12 RX ORDER — PREDNISONE 50 MG/1
50 TABLET ORAL DAILY
Qty: 5 TABLET | Refills: 0 | Status: SHIPPED | OUTPATIENT
Start: 2025-02-12 | End: 2025-02-17

## 2025-02-12 RX ADMIN — DEXAMETHASONE 10 MG: 6 TABLET ORAL at 17:50

## 2025-02-12 ASSESSMENT — PAIN SCALES - GENERAL: PAINLEVEL_OUTOF10: 4

## 2025-02-12 ASSESSMENT — PAIN DESCRIPTION - LOCATION: LOCATION: HEAD

## 2025-02-12 ASSESSMENT — PAIN - FUNCTIONAL ASSESSMENT: PAIN_FUNCTIONAL_ASSESSMENT: 0-10

## 2025-02-12 NOTE — DISCHARGE INSTRUCTIONS
Discussed visit today.  You are COVID-positive.  Influenza negative.  Please take the medications at home to help with your symptoms.  Please stay hydrated by drinking a lot of water.    Return to the emergency room with any worsening of symptoms

## 2025-02-12 NOTE — ED PROVIDER NOTES
[place] EMERGENCY DEPARTMENT  EMERGENCY DEPARTMENT ENCOUNTER      Pt Name: Marzena Roe  MRN: [id number]  Birthdate 1975  Date of evaluation: 2/12/2025  Provider: Nigel Templeton PA-C    CHIEF COMPLAINT       Chief Complaint   Patient presents with    Cough    Pharyngitis         HISTORY OF PRESENT ILLNESS    Patient is a 49-year-old female with history of allergies, asthma, hearing loss, obesity, vertigo, and plantar fasciitis who presents to the emergency room with reports of cough, sore throat, and facial pain that has been going on for the past 2 days.  Patient reports on Sunday she went to [employer] Bowl party where there were multiple people that were tested positive for COVID.  Patient reports she took Motrin at home for the headache, but no other medications prior to arrival. Patient denies chest pain, shortness of breath, abdominal pain, urinary symptoms, nausea or vomiting, diarrhea or constipation, dizziness, lightheadedness, fever or chills.  Patient denies alcohol use, former cigarette smoking, denies vaping or illicit drug use          Nursing Notes were reviewed.    REVIEW OF SYSTEMS       Review of Systems      PAST MEDICAL HISTORY     Past Medical History:   Diagnosis Date    Allergies     Asthma     Callus     Fallen arches     Hearing loss     Ingrown toenail     Obesity     Plantar fasciitis     Vertigo          SURGICAL HISTORY       Past Surgical History:   Procedure Laterality Date    ARM SURGERY Right 2017    Manipulation under anesthesia  for frozen shoulder    HYSTERECTOMY (CERVIX STATUS UNKNOWN)  2014    For endometriosis    TONSILLECTOMY           CURRENT MEDICATIONS       Discharge Medication List as of 2/12/2025  6:11 PM        CONTINUE these medications which have NOT CHANGED    Details   ondansetron (ZOFRAN-ODT) 4 MG disintegrating tablet Take 1 tablet by mouth 3 times daily as needed for Nausea or Vomiting, Disp-21 tablet, R-0Normal      tirzepatide-weight management

## 2025-02-16 ENCOUNTER — APPOINTMENT (OUTPATIENT)
Facility: HOSPITAL | Age: 50
End: 2025-02-16
Payer: COMMERCIAL

## 2025-02-16 ENCOUNTER — HOSPITAL ENCOUNTER (EMERGENCY)
Facility: HOSPITAL | Age: 50
Discharge: HOME | End: 2025-02-16
Attending: EMERGENCY MEDICINE
Payer: COMMERCIAL

## 2025-02-16 VITALS
RESPIRATION RATE: 18 BRPM | BODY MASS INDEX: 36.65 KG/M2 | SYSTOLIC BLOOD PRESSURE: 140 MMHG | HEART RATE: 84 BPM | WEIGHT: 220 LBS | OXYGEN SATURATION: 97 % | HEIGHT: 65 IN | DIASTOLIC BLOOD PRESSURE: 66 MMHG | TEMPERATURE: 97.7 F

## 2025-02-16 DIAGNOSIS — R09.81 HEAD CONGESTION: ICD-10-CM

## 2025-02-16 DIAGNOSIS — R05.1 ACUTE COUGH: ICD-10-CM

## 2025-02-16 DIAGNOSIS — U07.1 COVID-19: Primary | ICD-10-CM

## 2025-02-16 LAB
ALBUMIN SERPL-MCNC: 3.5 G/DL (ref 3.5–5.2)
ALBUMIN/GLOB SERPL: 1.3 (ref 1.1–2.2)
ALP SERPL-CCNC: 53 U/L (ref 35–104)
ALT SERPL-CCNC: 25 U/L (ref 10–35)
ANION GAP SERPL CALC-SCNC: 9 MMOL/L (ref 2–12)
APPEARANCE UR: ABNORMAL
AST SERPL-CCNC: 14 U/L (ref 10–35)
BACTERIA URNS QL MICRO: ABNORMAL /HPF
BASOPHILS # BLD: 0 K/UL (ref 0–0.1)
BASOPHILS NFR BLD: 0 % (ref 0–1)
BILIRUB SERPL-MCNC: <0.2 MG/DL (ref 0.2–1)
BILIRUB UR QL: NEGATIVE
BUN SERPL-MCNC: 18 MG/DL (ref 6–20)
BUN/CREAT SERPL: 27 (ref 12–20)
CALCIUM SERPL-MCNC: 8.7 MG/DL (ref 8.6–10)
CHLORIDE SERPL-SCNC: 106 MMOL/L (ref 98–107)
CO2 SERPL-SCNC: 26 MMOL/L (ref 22–29)
COLOR UR: ABNORMAL
CREAT SERPL-MCNC: 0.66 MG/DL (ref 0.5–0.9)
DIFFERENTIAL METHOD BLD: ABNORMAL
EOSINOPHIL # BLD: 0.23 K/UL (ref 0–0.4)
EOSINOPHIL NFR BLD: 2 % (ref 0–7)
EPITH CASTS URNS QL MICRO: ABNORMAL /LPF
ERYTHROCYTE [DISTWIDTH] IN BLOOD BY AUTOMATED COUNT: 12.5 % (ref 11.5–14.5)
GLOBULIN SER CALC-MCNC: 2.6 G/DL (ref 2–4)
GLUCOSE SERPL-MCNC: 102 MG/DL (ref 65–100)
GLUCOSE UR STRIP.AUTO-MCNC: NEGATIVE MG/DL
HCT VFR BLD AUTO: 43.7 % (ref 35–47)
HGB BLD-MCNC: 14.4 G/DL (ref 11.5–16)
HGB UR QL STRIP: NEGATIVE
IMM GRANULOCYTES # BLD AUTO: 0 K/UL
IMM GRANULOCYTES NFR BLD AUTO: 0 %
KETONES UR QL STRIP.AUTO: NEGATIVE MG/DL
LEUKOCYTE ESTERASE UR QL STRIP.AUTO: NEGATIVE
LYMPHOCYTES # BLD: 6.38 K/UL (ref 0.8–3.5)
LYMPHOCYTES NFR BLD: 56 % (ref 12–49)
MCH RBC QN AUTO: 28.9 PG (ref 26–34)
MCHC RBC AUTO-ENTMCNC: 33 G/DL (ref 30–36.5)
MCV RBC AUTO: 87.6 FL (ref 80–99)
MONOCYTES # BLD: 0.23 K/UL (ref 0–1)
MONOCYTES NFR BLD: 2 % (ref 5–13)
NEUTS SEG # BLD: 4.56 K/UL (ref 1.8–8)
NEUTS SEG NFR BLD: 40 % (ref 32–75)
NITRITE UR QL STRIP.AUTO: NEGATIVE
NRBC # BLD: 0 K/UL (ref 0–0.01)
NRBC BLD-RTO: 0 PER 100 WBC
PH UR STRIP: 6 (ref 5–8)
PLATELET # BLD AUTO: 289 K/UL (ref 150–400)
PMV BLD AUTO: 10.3 FL (ref 8.9–12.9)
POTASSIUM SERPL-SCNC: 3.9 MMOL/L (ref 3.5–5.1)
PROT SERPL-MCNC: 6.1 G/DL (ref 6.4–8.3)
PROT UR STRIP-MCNC: NEGATIVE MG/DL
RBC # BLD AUTO: 4.99 M/UL (ref 3.8–5.2)
RBC #/AREA URNS HPF: ABNORMAL /HPF
RBC MORPH BLD: ABNORMAL
SODIUM SERPL-SCNC: 141 MMOL/L (ref 136–145)
SP GR UR REFRACTOMETRY: 1.03 (ref 1–1.03)
SPECIMEN HOLD: NORMAL
UROBILINOGEN UR QL STRIP.AUTO: 0.2 EU/DL (ref 0.2–1)
WBC # BLD AUTO: 11.4 K/UL (ref 3.6–11)
WBC URNS QL MICRO: ABNORMAL /HPF (ref 0–4)

## 2025-02-16 PROCEDURE — 6360000002 HC RX W HCPCS

## 2025-02-16 PROCEDURE — 99285 EMERGENCY DEPT VISIT HI MDM: CPT

## 2025-02-16 PROCEDURE — 36415 COLL VENOUS BLD VENIPUNCTURE: CPT

## 2025-02-16 PROCEDURE — 81001 URINALYSIS AUTO W/SCOPE: CPT

## 2025-02-16 PROCEDURE — 93005 ELECTROCARDIOGRAM TRACING: CPT

## 2025-02-16 PROCEDURE — 80053 COMPREHEN METABOLIC PANEL: CPT

## 2025-02-16 PROCEDURE — 96374 THER/PROPH/DIAG INJ IV PUSH: CPT

## 2025-02-16 PROCEDURE — 6370000000 HC RX 637 (ALT 250 FOR IP)

## 2025-02-16 PROCEDURE — 71046 X-RAY EXAM CHEST 2 VIEWS: CPT

## 2025-02-16 PROCEDURE — 85025 COMPLETE CBC W/AUTO DIFF WBC: CPT

## 2025-02-16 RX ORDER — IBUPROFEN 800 MG/1
800 TABLET, FILM COATED ORAL 2 TIMES DAILY PRN
Qty: 60 TABLET | Refills: 0 | Status: SHIPPED | OUTPATIENT
Start: 2025-02-16

## 2025-02-16 RX ORDER — ACETAMINOPHEN 500 MG
1000 TABLET ORAL
Status: COMPLETED | OUTPATIENT
Start: 2025-02-16 | End: 2025-02-16

## 2025-02-16 RX ORDER — KETOROLAC TROMETHAMINE 30 MG/ML
30 INJECTION, SOLUTION INTRAMUSCULAR; INTRAVENOUS ONCE
Status: COMPLETED | OUTPATIENT
Start: 2025-02-16 | End: 2025-02-16

## 2025-02-16 RX ORDER — ACETAMINOPHEN 500 MG
1000 TABLET ORAL EVERY 6 HOURS PRN
Qty: 60 TABLET | Refills: 0 | Status: SHIPPED | OUTPATIENT
Start: 2025-02-16

## 2025-02-16 RX ADMIN — KETOROLAC TROMETHAMINE 30 MG: 30 INJECTION, SOLUTION INTRAMUSCULAR at 14:01

## 2025-02-16 RX ADMIN — ACETAMINOPHEN 1000 MG: 500 TABLET ORAL at 14:02

## 2025-02-16 ASSESSMENT — PAIN DESCRIPTION - LOCATION
LOCATION: HEAD
LOCATION: HEAD

## 2025-02-16 ASSESSMENT — PAIN SCALES - GENERAL
PAINLEVEL_OUTOF10: 7
PAINLEVEL_OUTOF10: 7

## 2025-02-16 ASSESSMENT — PAIN DESCRIPTION - DESCRIPTORS
DESCRIPTORS: THROBBING
DESCRIPTORS: THROBBING

## 2025-02-16 ASSESSMENT — PAIN - FUNCTIONAL ASSESSMENT: PAIN_FUNCTIONAL_ASSESSMENT: NONE - DENIES PAIN

## 2025-02-16 NOTE — ED TRIAGE NOTES
Patient arrives to ed via pov with c/o worsening congestion since dx with covid on Tuesday. Pt denies fevers. Pt taking mucinex, tessalon pearls, prednisone

## 2025-02-16 NOTE — ED PROVIDER NOTES
[place] EMERGENCY DEPARTMENT  EMERGENCY DEPARTMENT ENCOUNTER      Pt Name: Marzena Roe  MRN: [id number]  Birthdate 1975  Date of evaluation: 2/16/2025  Provider: Farnaz Caceres PA-C    CHIEF COMPLAINT       Chief Complaint   Patient presents with    Nasal Congestion         HISTORY OF PRESENT ILLNESS   (Location/Symptom, Timing/Onset, Context/Setting, Quality, Duration, Modifying Factors, Severity)  Note limiting factors.   49-year-old female presenting with worsening congestion after being diagnosed with COVID on Wednesday.  She was discharged with Tessalon Perles, prednisone, and Mucinex for symptoms, but reports that she initially felt better within 2 days, but now has been feeling worse for the last few days.  She complains of vague abdominal cramping as well that is a new symptom.  She admits to sore throat, but states that she had tonsillectomy.  Her main complaint is worsening congestion of her sinuses and head.  Denies fevers, vomiting, changes in urinary or bowel habits.  She also states that the work note that she was given states that she should go back to work tomorrow, but says that she does not feel well enough to go back to work tomorrow.            Review of External Medical Records:     Nursing Notes were reviewed.    REVIEW OF SYSTEMS    (2-9 systems for level 4, 10 or more for level 5)     Review of Systems    Except as noted above the remainder of the review of systems was reviewed and negative.       PAST MEDICAL HISTORY     Past Medical History:   Diagnosis Date    Allergies     Asthma     Callus     Fallen arches     Hearing loss     Ingrown toenail     Obesity     Plantar fasciitis     Vertigo          SURGICAL HISTORY       Past Surgical History:   Procedure Laterality Date    ARM SURGERY Right 2017    Manipulation under anesthesia  for frozen shoulder    HYSTERECTOMY (CERVIX STATUS UNKNOWN)  2014    For endometriosis    TONSILLECTOMY           CURRENT MEDICATIONS

## 2025-02-16 NOTE — DISCHARGE INSTRUCTIONS
Your workup today was rather reassuring.  As you know, you are diagnosed with COVID within the week so your blood work does show an elevated white blood cell count which can be attributed to this viral illness.  Your urinalysis did not show any abnormalities that would need treatment and your chest x-ray did not show any signs of pneumonia or other abnormalities.  Your EKG was also reassuring in terms of your complaints of shortness of breath that you are having.  As you know, COVID is a viral illness and will resolve on its own.  You are likely still suffering from this as it can take 1 to 2 weeks to start feeling better and even longer for cough to resolve.  Your exam overall is well and nonconcerning for emergent management.  I recommend continuing your current regimen that you are given when you are seen here before, and also rotating ibuprofen and Tylenol or taking them at the same time for your symptoms.  You may also use over-the-counter methods such as saline rinses for your nose or saline drops for your nose congestion or other symptomatic over-the-counter remedies that you wish.  If your symptoms persist, change, or worsen then you should come back to the emergency department.  I do however recommend seeing your primary care provider in the next 2 days to ensure that you are starting to feel better.     Thank you for allowing us to provide you with medical care today.  We realize that you have many choices for your emergency care needs.  We thank you for choosing [facility].  Please choose us in the future for any continued health care needs.     The exam and treatment you received in the Emergency Department were for an emergent problem and are not intended as complete care. It is important that you follow up with a doctor, nurse practitioner, or physician assistant for ongoing care. If your symptoms worsen or you do not improve as expected and you are unable to reach your usual health care provider, you

## 2025-02-17 LAB
EKG ATRIAL RATE: 64 BPM
EKG DIAGNOSIS: NORMAL
EKG P AXIS: 53 DEGREES
EKG P-R INTERVAL: 178 MS
EKG Q-T INTERVAL: 410 MS
EKG QRS DURATION: 84 MS
EKG QTC CALCULATION (BAZETT): 422 MS
EKG R AXIS: 90 DEGREES
EKG T AXIS: -13 DEGREES
EKG VENTRICULAR RATE: 64 BPM

## 2025-02-17 PROCEDURE — 93010 ELECTROCARDIOGRAM REPORT: CPT | Performed by: SPECIALIST

## 2025-02-23 ENCOUNTER — HOSPITAL ENCOUNTER (EMERGENCY)
Facility: HOSPITAL | Age: 50
Discharge: LEFT BEFORE DISCHARGE | End: 2025-02-23
Attending: STUDENT IN AN ORGANIZED HEALTH CARE EDUCATION/TRAINING PROGRAM

## 2025-02-23 VITALS
RESPIRATION RATE: 16 BRPM | BODY MASS INDEX: 36.65 KG/M2 | HEART RATE: 92 BPM | WEIGHT: 220 LBS | TEMPERATURE: 97.9 F | DIASTOLIC BLOOD PRESSURE: 87 MMHG | SYSTOLIC BLOOD PRESSURE: 139 MMHG | HEIGHT: 65 IN | OXYGEN SATURATION: 97 %

## 2025-02-23 ASSESSMENT — LIFESTYLE VARIABLES
HOW OFTEN DO YOU HAVE A DRINK CONTAINING ALCOHOL: NEVER
HOW MANY STANDARD DRINKS CONTAINING ALCOHOL DO YOU HAVE ON A TYPICAL DAY: PATIENT DOES NOT DRINK

## 2025-02-23 ASSESSMENT — PAIN - FUNCTIONAL ASSESSMENT: PAIN_FUNCTIONAL_ASSESSMENT: NONE - DENIES PAIN

## 2025-02-24 ENCOUNTER — OFFICE VISIT (OUTPATIENT)
Dept: PRIMARY CARE CLINIC | Facility: CLINIC | Age: 50
End: 2025-02-24
Payer: COMMERCIAL

## 2025-02-24 VITALS
WEIGHT: 222 LBS | HEIGHT: 65 IN | HEART RATE: 78 BPM | TEMPERATURE: 98 F | BODY MASS INDEX: 36.99 KG/M2 | DIASTOLIC BLOOD PRESSURE: 82 MMHG | SYSTOLIC BLOOD PRESSURE: 132 MMHG | RESPIRATION RATE: 16 BRPM | OXYGEN SATURATION: 97 %

## 2025-02-24 DIAGNOSIS — U07.1 COVID-19: Primary | ICD-10-CM

## 2025-02-24 LAB
EXP DATE SOLUTION: ABNORMAL
EXP DATE SWAB: ABNORMAL
EXPIRATION DATE: ABNORMAL
LOT NUMBER POC: ABNORMAL
LOT NUMBER SOLUTION: ABNORMAL
LOT NUMBER SWAB: ABNORMAL
SARS-COV-2 RNA, POC: POSITIVE

## 2025-02-24 PROCEDURE — 87635 SARS-COV-2 COVID-19 AMP PRB: CPT | Performed by: FAMILY MEDICINE

## 2025-02-24 PROCEDURE — 99213 OFFICE O/P EST LOW 20 MIN: CPT | Performed by: FAMILY MEDICINE

## 2025-02-24 ASSESSMENT — ENCOUNTER SYMPTOMS
SHORTNESS OF BREATH: 0
DIARRHEA: 0
ABDOMINAL PAIN: 0
CONSTIPATION: 0

## 2025-02-24 NOTE — ED TRIAGE NOTES
Pt ambulatory to ED w/ c/o COVID retesting due to job. Pt endorses testing + for COVID last week.     Pt denies symptoms

## 2025-02-24 NOTE — PROGRESS NOTES
\"Have you been to the ER, urgent care clinic since your last visit?  Hospitalized since your last visit?\"    NO    “Have you seen or consulted any other health care providers outside our system since your last visit?”    NO    Chief Complaint   Patient presents with    Follow-up     Covid retest.        /82 (Site: Left Upper Arm, Position: Sitting, Cuff Size: Large Adult)   Pulse 78   Temp 98 °F (36.7 °C) (Temporal)   Resp 16   Ht 1.651 m (5' 5\")   Wt 100.7 kg (222 lb)   SpO2 97%   BMI 36.94 kg/m²

## 2025-02-24 NOTE — PROGRESS NOTES
PCP: Mireya Parrish DO    CC: Follow-up (Covid retest. )      Subjective      Marzena Roe is a 49 y.o. female,Established patient, who presents for COVID re-test.    Patient was diagnosed with COVID 19 on 2/12/2025.  She did start having symptoms on 2/6/2025 which potentially started off as a viral gastroenteritis.  Patient overall is feeling much better. She did have a friend tht had COVID on [employer] [place]. No fevers or chills, no diarrhea or abdominal pain. She still does not have much of an appetite.     Review of Systems   Constitutional:  Positive for appetite change. Negative for chills, fever and unexpected weight change.   HENT: Negative.     Eyes:  Negative for visual disturbance.   Respiratory:  Negative for shortness of breath.    Gastrointestinal:  Negative for abdominal pain, constipation and diarrhea.   Genitourinary: Negative.    Musculoskeletal: Negative.    Skin:  Negative for rash.   Neurological:  Negative for dizziness and headaches.   Psychiatric/Behavioral:  Negative for dysphoric mood and suicidal ideas.          Objective      Vitals:    02/24/25 0718   BP: 132/82   Site: Left Upper Arm   Position: Sitting   Cuff Size: Large Adult   Pulse: 78   Resp: 16   Temp: 98 °F (36.7 °C)   TempSrc: Temporal   SpO2: 97%   Weight: 100.7 kg (222 lb)   Height: 1.651 m (5' 5\")      Body mass index is 36.94 kg/m².         Physical Exam  HENT:      Head: Normocephalic.      Right Ear: External ear normal.      Left Ear: External ear normal.      Nose: Nose normal.   Eyes:      Extraocular Movements: Extraocular movements intact.      Conjunctiva/sclera: Conjunctivae normal.      Pupils: Pupils are equal, round, and reactive to light.   Cardiovascular:      Rate and Rhythm: Normal rate and regular rhythm.      Pulses: Normal pulses.   Pulmonary:      Effort: Pulmonary effort is normal.      Breath sounds: Normal breath sounds. No wheezing.   Abdominal:      General: Bowel sounds are normal.

## 2025-04-16 ENCOUNTER — OFFICE VISIT (OUTPATIENT)
Dept: PRIMARY CARE CLINIC | Facility: CLINIC | Age: 50
End: 2025-04-16
Payer: COMMERCIAL

## 2025-04-16 VITALS
WEIGHT: 227.8 LBS | TEMPERATURE: 97.7 F | HEIGHT: 65 IN | SYSTOLIC BLOOD PRESSURE: 138 MMHG | HEART RATE: 71 BPM | DIASTOLIC BLOOD PRESSURE: 82 MMHG | OXYGEN SATURATION: 97 % | BODY MASS INDEX: 37.95 KG/M2

## 2025-04-16 DIAGNOSIS — H92.03 EAR PAIN, BILATERAL: Primary | ICD-10-CM

## 2025-04-16 PROCEDURE — 99213 OFFICE O/P EST LOW 20 MIN: CPT | Performed by: FAMILY MEDICINE

## 2025-04-16 ASSESSMENT — ENCOUNTER SYMPTOMS
CONSTIPATION: 0
SHORTNESS OF BREATH: 0
ABDOMINAL PAIN: 0
DIARRHEA: 0

## 2025-04-16 NOTE — PATIENT INSTRUCTIONS
-Try Xyzal for your allergies  -Continue the nasal Mucinex,  but for no more than 5 days  -Use nasal saline spray for dry nasal passages  -Keep water out of your ear the best you can  (avoid swimming, holding ear under the showerhead, etc)

## 2025-04-16 NOTE — PROGRESS NOTES
PCP: Mireya Parrish DO    CC: Ear Pain (Ear bothering her since yesterday and thinks its allergies because she is congested as well. Takes claritan and nasal spray. )      Subjective      Marzena Roe is a 49 y.o. female,Established patient, who presents for ear pain and congestion.    Ongoing since yesterday. Thinks it may be her allergies. She currently uses Mucinex nasal spray and claritin. Both of her ears are painful, and she notes hearing is a bit muffled. Had a lot of surgeries on her ears when she was a kid. She is supposed to get hearing aids, but she cannot afford them at this time. Ears are itching on the inside. Denies any popping sensations of her ears No dry skin. Has not been to ENT in quite some time.    Review of Systems   Constitutional:  Negative for chills, fever and unexpected weight change.   HENT:  Positive for congestion and ear pain.    Eyes:  Negative for visual disturbance.   Respiratory:  Negative for shortness of breath.    Gastrointestinal:  Negative for abdominal pain, constipation and diarrhea.   Genitourinary: Negative.    Musculoskeletal: Negative.    Skin:  Negative for rash.   Neurological:  Negative for dizziness and headaches.   Psychiatric/Behavioral:  Negative for dysphoric mood and suicidal ideas.        Objective      Vitals:    04/16/25 1623 04/16/25 1626 04/16/25 1654   BP: (!) 163/90 (!) 152/88 138/82   BP Site: Left Upper Arm     Patient Position: Sitting     BP Cuff Size: Large Adult     Pulse: 71     Temp: 97.7 °F (36.5 °C)     TempSrc: Oral     SpO2: 97%     Weight: 103.3 kg (227 lb 12.8 oz)     Height: 1.651 m (5' 5\")        Body mass index is 37.91 kg/m².     Physical Exam  HENT:      Head: Normocephalic.      Right Ear: External ear normal. Tympanic membrane is perforated.      Left Ear: External ear normal.      Nose: Nose normal.   Eyes:      Extraocular Movements: Extraocular movements intact.      Conjunctiva/sclera: Conjunctivae normal.

## 2025-04-28 RX ORDER — TIRZEPATIDE 10 MG/.5ML
INJECTION, SOLUTION SUBCUTANEOUS
Qty: 2 ML | OUTPATIENT
Start: 2025-04-28

## 2025-05-21 DIAGNOSIS — E66.812 OBESITY, CLASS II, BMI 35-39.9: ICD-10-CM

## 2025-05-21 RX ORDER — TIRZEPATIDE 12.5 MG/.5ML
INJECTION, SOLUTION SUBCUTANEOUS
Qty: 2 ML | Refills: 1 | Status: SHIPPED | OUTPATIENT
Start: 2025-05-21

## 2025-06-03 ENCOUNTER — OFFICE VISIT (OUTPATIENT)
Age: 50
End: 2025-06-03
Payer: COMMERCIAL

## 2025-06-03 VITALS
DIASTOLIC BLOOD PRESSURE: 84 MMHG | HEIGHT: 65 IN | OXYGEN SATURATION: 98 % | HEART RATE: 87 BPM | SYSTOLIC BLOOD PRESSURE: 134 MMHG | RESPIRATION RATE: 18 BRPM | WEIGHT: 234 LBS | BODY MASS INDEX: 38.99 KG/M2

## 2025-06-03 DIAGNOSIS — H72.91 PERFORATION OF RIGHT TYMPANIC MEMBRANE: ICD-10-CM

## 2025-06-03 DIAGNOSIS — H93.93 CHRONIC DISEASE OF EAR, BILATERAL: Primary | ICD-10-CM

## 2025-06-03 DIAGNOSIS — J30.9 ALLERGIC RHINITIS, UNSPECIFIED SEASONALITY, UNSPECIFIED TRIGGER: ICD-10-CM

## 2025-06-03 DIAGNOSIS — H69.93 CHRONIC DYSFUNCTION OF BOTH EUSTACHIAN TUBES: ICD-10-CM

## 2025-06-03 PROCEDURE — 99204 OFFICE O/P NEW MOD 45 MIN: CPT | Performed by: OTOLARYNGOLOGY

## 2025-06-03 RX ORDER — AZELASTINE 1 MG/ML
1 SPRAY, METERED NASAL 2 TIMES DAILY
Qty: 60 ML | Refills: 1 | Status: SHIPPED | OUTPATIENT
Start: 2025-06-03 | End: 2025-06-03

## 2025-06-03 RX ORDER — AZELASTINE 1 MG/ML
1 SPRAY, METERED NASAL 2 TIMES DAILY
Qty: 60 ML | Refills: 1 | Status: SHIPPED | OUTPATIENT
Start: 2025-06-03

## 2025-06-03 NOTE — PROGRESS NOTES
Otolaryngology-Head and Neck Surgery  New Patient Visit     Patient: Marzena Roe  YOB: 1975  MRN: [id number]  Date of Service: 6/3/2025    Chief Complaint:   Chief Complaint   Patient presents with    New Patient      bilateral ear pain         History of Present Illness: Marzena Roe is a 49 y.o. female who presents today for discussion of her ears.     Chronic ear issues, ongoing since childhood  Multiple sets of ear tubes as a child, potentially a history of tympanoplasty but this is unclear    No ear surgeries as an adult    Some hearing loss - audiogram several years ago    Feels that one ear better but unsure which    Does have suspected allergies, using flonase, xyzal  No prior test      Past Medical History:  Past Medical History:   Diagnosis Date    Allergies     Asthma     Callus     Fallen arches     Hearing loss     Ingrown toenail     Obesity     Plantar fasciitis     Vertigo        Past Surgical History:   Past Surgical History:   Procedure Laterality Date    ARM SURGERY Right 2017    Manipulation under anesthesia  for frozen shoulder    HYSTERECTOMY (CERVIX STATUS UNKNOWN)  2014    For endometriosis    HYSTERECTOMY, VAGINAL  1/17/14    I had endemetrious    TONSILLECTOMY         Medications:   Current Outpatient Medications   Medication Instructions    acetaminophen (TYLENOL) 1,000 mg, Oral, EVERY 6 HOURS PRN    ammonium lactate (LAC-HYDRIN) 12 % cream Apply topically to the bilateral feet 2 times a day    ciclopirox (LOPROX) 0.77 % cream Apply topically 2 times daily to affected toenails.    estradiol (ESTRACE) 2 mg, Oral, DAILY    fluticasone (FLONASE ALLERGY RELIEF) 50 MCG/ACT nasal spray 2 sprays, Each Nostril, DAILY PRN    ibuprofen (ADVIL;MOTRIN) 800 mg, Oral, 2 TIMES DAILY PRN    ondansetron (ZOFRAN-ODT) 4 mg, Oral, 3 TIMES DAILY PRN    tirzepatide-weight management (ZEPBOUND) 12.5 MG/0.5ML SOAJ subCUTAneous auto-injector pen ADMINISTER 12.5 MG UNDER THE SKIN

## 2025-06-17 DIAGNOSIS — E66.812 OBESITY, CLASS II, BMI 35-39.9: ICD-10-CM

## 2025-06-17 RX ORDER — TIRZEPATIDE 12.5 MG/.5ML
12.5 INJECTION, SOLUTION SUBCUTANEOUS WEEKLY
Qty: 2 ML | Refills: 1 | Status: SHIPPED | OUTPATIENT
Start: 2025-06-17

## 2025-07-01 ENCOUNTER — PROCEDURE VISIT (OUTPATIENT)
Age: 50
End: 2025-07-01
Payer: COMMERCIAL

## 2025-07-01 DIAGNOSIS — H90.A31 MIXED CONDUCTIVE AND SENSORINEURAL HEARING LOSS OF RIGHT EAR WITH RESTRICTED HEARING OF LEFT EAR: ICD-10-CM

## 2025-07-01 DIAGNOSIS — H90.A22 SENSORINEURAL HEARING LOSS (SNHL) OF LEFT EAR WITH RESTRICTED HEARING OF RIGHT EAR: Primary | ICD-10-CM

## 2025-07-01 DIAGNOSIS — R42 DIZZINESS AND GIDDINESS: ICD-10-CM

## 2025-07-01 PROCEDURE — 92567 TYMPANOMETRY: CPT

## 2025-07-01 PROCEDURE — 92557 COMPREHENSIVE HEARING TEST: CPT

## 2025-07-01 NOTE — PROGRESS NOTES
Marzena Roe   1975, 49 y.o. female   [id number]       Referring Provider: Layne Hardy MD    AUDIOLOGIC EVALUATION      Marzena Roe is a 49 y.o. female seen today, 7/1/2025, for an audiologic evaluation.    PERTINENT MEDICAL HISTORY:      Marzena Roe reports bilateral hearing loss with one ear better than the other. She notes a lot of middle ear issues, tubes, and possibly tympanic membrane surgeries as a child. She has been prescribed hearing aids before, but those facilities never followed through with their offer. She notes intermittent aural pressure, right itching, sharp otalgia, and intermittent dizziness that lasts minutes at a time. The dizziness is described as cloudy vision.    IMPRESSIONS:      Today's results revealed mild rising to normal hearing in the left ear and moderately-severe mixed hearing loss rising to normal hearing in the right ear. Excellent speech understanding when in quiet. Tympanometry shows a normal left tympanogram and type B right tympanogram.    Follow medical recommendations of primary care physician and referring provider.    ASSESSMENT AND FINDINGS:     Otoscopy: Heavily scarred tympanic membranes      RIGHT EAR:  Hearing Sensitivity: Moderately-severe mixed hearing loss rising to normal hearing with a mild conductive component at 4000 Hz. Asymmetric hearing with the right ear poorer than the left ear from 250 to 1000 Hz  Speech Reception Threshold: 45 dB HL  Word Recognition: Excellent 100%, based on NU-6 by-difficulty list at 70 dB HL using recorded speech stimuli  Tympanometry: Type B with normal ear canal volume, consistent with abnormal middle ear function      LEFT EAR:  Hearing Sensitivity: Mild sensorineural hearing loss rising to normal hearing  Speech Reception Threshold: 30 dB HL  Word Recognition: Excellent 100%, based on NU-6 by-difficulty list at 70 dB HL using recorded speech stimuli  Tympanometry: Type A, consistent with normal

## 2025-07-01 NOTE — PATIENT INSTRUCTIONS
source.    Exposure to these sounds may cause permanent damage to your hearing.  If you suspect your hearing has changed, it is recommended that you have your hearing tested by your audiologist.            Good Communication Strategies    Communication can be challenging for anyone, but can be especially difficult for those with some degree of hearing loss.  While we may not be able to control every factor that may lead to difficulty with communication, there are Good Communication Strategies that we can all use in our day-to-day lives.  Communication takes both parties working together for it to be successful.    Tips as a Listener:   Control your environment.  It is important to limit the amount of background noise in the room when possible.  You should also consider having a good light source in the room to best see the other person.  Ask for clarification.  Instead of saying \"What?\", you can use parts of what you heard to make a new question.  For example, if you heard the word \"Thursday\" but not the rest of the week, you may ask \"What was that about Thursday?\" or \"What did you want to do Thursday?\".  This shows the person talking that you are listening and will help them better explain what they are saying.  Be an advocate for yourself.  If you are hearing but not understanding, tell the other person \"I can hear you, but I need you to slow down when you speak.\"  Or if someone is facing the other direction, say \"I cannot hear you when you are not looking at me when we talk.\"       Tips as a Talker:   - Sit or stand 3 to 6 feet away to maximize audibility         -- It is unrealistic to believe someone else will fully hear your message if you are speaking from across the room or in a different room in the house   - Stay at eye level to help with visual cues   - Make sure you have the person’s attention before speaking   - Use facial expressions and gestures to accentuate your message   - Raise your voice slightly

## 2025-07-18 ENCOUNTER — OFFICE VISIT (OUTPATIENT)
Dept: PRIMARY CARE CLINIC | Facility: CLINIC | Age: 50
End: 2025-07-18
Payer: COMMERCIAL

## 2025-07-18 VITALS
HEIGHT: 65 IN | TEMPERATURE: 98.1 F | WEIGHT: 230.8 LBS | OXYGEN SATURATION: 94 % | SYSTOLIC BLOOD PRESSURE: 140 MMHG | HEART RATE: 66 BPM | BODY MASS INDEX: 38.45 KG/M2 | DIASTOLIC BLOOD PRESSURE: 85 MMHG

## 2025-07-18 DIAGNOSIS — Z12.31 SCREENING MAMMOGRAM, ENCOUNTER FOR: Primary | ICD-10-CM

## 2025-07-18 DIAGNOSIS — I10 ESSENTIAL (PRIMARY) HYPERTENSION: ICD-10-CM

## 2025-07-18 DIAGNOSIS — E66.812 OBESITY, CLASS II, BMI 35-39.9: Primary | ICD-10-CM

## 2025-07-18 PROCEDURE — 3077F SYST BP >= 140 MM HG: CPT | Performed by: FAMILY MEDICINE

## 2025-07-18 PROCEDURE — 3079F DIAST BP 80-89 MM HG: CPT | Performed by: FAMILY MEDICINE

## 2025-07-18 PROCEDURE — 99214 OFFICE O/P EST MOD 30 MIN: CPT | Performed by: FAMILY MEDICINE

## 2025-07-18 RX ORDER — LOSARTAN POTASSIUM 25 MG/1
12.5 TABLET ORAL DAILY
Qty: 15 TABLET | Refills: 1 | Status: SHIPPED | OUTPATIENT
Start: 2025-07-18

## 2025-07-18 ASSESSMENT — ENCOUNTER SYMPTOMS
DIARRHEA: 0
ABDOMINAL PAIN: 0
CONSTIPATION: 0
SHORTNESS OF BREATH: 0

## 2025-07-18 NOTE — ASSESSMENT & PLAN NOTE
training  - Discussed the importance of adequate sleep and good sleep hygiene on weight management  - Will send for Zepbound 2.5 mg weekly, with goal to increase back to her previous dosage of 12.5 mg weekly

## 2025-07-18 NOTE — PROGRESS NOTES
PCP: Mireya Parrish DO    CC: Follow-up (Discuss weight loss med. )      Subjective      Marzena Roe is a 50 y.o. female,Established patient, who presents for weight loss follow up.    Initial Weight: 258 lbs  Today's Weight: 230 lbs  Goal weight: 199 lbs    Overall patient was doing well on Wegovy at first, then transitioned to Zepbound, but has not been able to get the medication. Patient was on the 12.5 mg dose and was tolerating medication with minimal to no side effects.     She started Wegovy in Summer 2023, and was maxed out to 15 mg and then switched to Zepbound which she maxed out on 12.5 mg before the medication was no longer covered. Last injection 5 weeks ago. Starting weight 258 in July 2023.    - Diet: Tracking calories--1700 calories per day, for her weight was initially recommended 1900 calories; meal preps (boiled eggs, turkey sticks, turkey alvarez, quest protein muffins, fruit, greek yogurt,  chicken salad, salad, grilled chicken, broccoli, cauliflower rice) will sometimes eat sweets (zero sugar or reduced fat cool whip), sometimes snacks at night--sting cheese, grapes, 647 bread w/ PB   - Beverages: Water and zero sugar pepsi   - Exercise: Walking videos--30 minutes 3 times per week, stepper machine, 4 times per week    - Eating out: 0 times per week.   - Sleep: 6 hours per night   - Supplements: Multivitamin     HTN: Patient has been dealing with hide blood pressure since the birth of her daughter 26 years ago.  She has never been on any blood pressure medication, and was trying to make it a goal of hers to not have to be on blood pressure medications.  She was trying to use lifestyle modification and weight loss to help bring her blood pressure down.  She denies any chest pain, difficulty breathing, dizziness, headaches, and vision changes.  Diet and exercise as above.        Review of Systems   Constitutional:  Negative for chills, fever and unexpected weight change.   HENT:

## 2025-07-21 ENCOUNTER — OFFICE VISIT (OUTPATIENT)
Age: 50
End: 2025-07-21
Payer: COMMERCIAL

## 2025-07-21 VITALS
BODY MASS INDEX: 38.39 KG/M2 | SYSTOLIC BLOOD PRESSURE: 122 MMHG | HEART RATE: 71 BPM | OXYGEN SATURATION: 97 % | DIASTOLIC BLOOD PRESSURE: 84 MMHG | RESPIRATION RATE: 18 BRPM | HEIGHT: 65 IN | WEIGHT: 230.4 LBS

## 2025-07-21 DIAGNOSIS — H90.A31 MIXED CONDUCTIVE AND SENSORINEURAL HEARING LOSS OF RIGHT EAR WITH RESTRICTED HEARING OF LEFT EAR: ICD-10-CM

## 2025-07-21 DIAGNOSIS — H72.91 PERFORATION OF RIGHT TYMPANIC MEMBRANE: ICD-10-CM

## 2025-07-21 DIAGNOSIS — H93.93 CHRONIC DISEASE OF EAR, BILATERAL: Primary | ICD-10-CM

## 2025-07-21 DIAGNOSIS — H69.93 CHRONIC DYSFUNCTION OF BOTH EUSTACHIAN TUBES: ICD-10-CM

## 2025-07-21 PROCEDURE — 99214 OFFICE O/P EST MOD 30 MIN: CPT | Performed by: OTOLARYNGOLOGY

## 2025-07-21 NOTE — PROGRESS NOTES
Otolaryngology-Head and Neck Surgery  Follow Up Patient Visit     Patient: Marzena Roe  YOB: 1975  MRN: [id number]  Date of Service: 7/21/2025    Chief Complaint:   Chief Complaint   Patient presents with    Follow-up     Chronic disease of ear, bilateral       Interval hx 7/21/2025  Had audiogram   Astelin nasal spray was helpful     History of Present Illness: Marzena Roe is a 50 y.o. female who presents today for discussion of her ears.     Chronic ear issues, ongoing since childhood  Multiple sets of ear tubes as a child, potentially a history of tympanoplasty but this is unclear    No ear surgeries as an adult    Some hearing loss - audiogram several years ago    Feels that one ear better but unsure which    Does have suspected allergies, using flonase, xyzal  No prior test      Past Medical History:  Past Medical History:   Diagnosis Date    Abnormal Pap smear of cervix     Allergies     Asthma     Callus     Fallen arches     Hearing loss     Ingrown toenail     Obesity     Plantar fasciitis     Vertigo        Past Surgical History:   Past Surgical History:   Procedure Laterality Date    ARM SURGERY Right 2017    Manipulation under anesthesia  for frozen shoulder    HYSTERECTOMY (CERVIX STATUS UNKNOWN)  2014    For endometriosis    HYSTERECTOMY, VAGINAL  1/17/14    I had endemetrious    MYOMECTOMY  01/17/2014    TONSILLECTOMY         Medications:   Current Outpatient Medications   Medication Instructions    acetaminophen (TYLENOL) 1,000 mg, Oral, EVERY 6 HOURS PRN    ammonium lactate (LAC-HYDRIN) 12 % cream Apply topically to the bilateral feet 2 times a day    azelastine (ASTELIN) 0.1 % nasal spray 1 spray, Nasal, 2 TIMES DAILY, Use in each nostril as directed    ciclopirox (LOPROX) 0.77 % cream Apply topically 2 times daily to affected toenails.    estradiol (ESTRACE) 2 mg, Oral, DAILY    fluticasone (FLONASE ALLERGY RELIEF) 50 MCG/ACT nasal spray 2 sprays, Each Nostril,

## 2025-07-22 ENCOUNTER — TELEPHONE (OUTPATIENT)
Age: 50
End: 2025-07-22

## 2025-07-22 NOTE — TELEPHONE ENCOUNTER
Called to scheduled pt for ear procedure in office or in the OR. Left direct line for callback. SDF

## 2025-08-11 ENCOUNTER — PROCEDURE VISIT (OUTPATIENT)
Age: 50
End: 2025-08-11

## 2025-08-11 DIAGNOSIS — H90.A22 SENSORINEURAL HEARING LOSS (SNHL) OF LEFT EAR WITH RESTRICTED HEARING OF RIGHT EAR: Primary | ICD-10-CM

## 2025-08-11 DIAGNOSIS — H90.A31 MIXED CONDUCTIVE AND SENSORINEURAL HEARING LOSS OF RIGHT EAR WITH RESTRICTED HEARING OF LEFT EAR: ICD-10-CM

## 2025-08-11 DIAGNOSIS — E66.812 OBESITY, CLASS II, BMI 35-39.9: Primary | ICD-10-CM

## 2025-08-14 ENCOUNTER — OFFICE VISIT (OUTPATIENT)
Dept: PRIMARY CARE CLINIC | Facility: CLINIC | Age: 50
End: 2025-08-14
Payer: COMMERCIAL

## 2025-08-14 VITALS
DIASTOLIC BLOOD PRESSURE: 88 MMHG | HEIGHT: 65 IN | HEART RATE: 71 BPM | SYSTOLIC BLOOD PRESSURE: 126 MMHG | OXYGEN SATURATION: 96 % | TEMPERATURE: 97.6 F | BODY MASS INDEX: 38.15 KG/M2 | WEIGHT: 229 LBS

## 2025-08-14 DIAGNOSIS — Z23 ENCOUNTER FOR IMMUNIZATION: ICD-10-CM

## 2025-08-14 DIAGNOSIS — E66.812 OBESITY, CLASS II, BMI 35-39.9: ICD-10-CM

## 2025-08-14 DIAGNOSIS — I10 ESSENTIAL (PRIMARY) HYPERTENSION: Primary | ICD-10-CM

## 2025-08-14 PROCEDURE — 90677 PCV20 VACCINE IM: CPT | Performed by: FAMILY MEDICINE

## 2025-08-14 PROCEDURE — 3074F SYST BP LT 130 MM HG: CPT | Performed by: FAMILY MEDICINE

## 2025-08-14 PROCEDURE — 99214 OFFICE O/P EST MOD 30 MIN: CPT | Performed by: FAMILY MEDICINE

## 2025-08-14 PROCEDURE — 90471 IMMUNIZATION ADMIN: CPT | Performed by: FAMILY MEDICINE

## 2025-08-14 PROCEDURE — 3079F DIAST BP 80-89 MM HG: CPT | Performed by: FAMILY MEDICINE

## 2025-08-14 ASSESSMENT — ENCOUNTER SYMPTOMS
DIARRHEA: 0
CONSTIPATION: 0
SHORTNESS OF BREATH: 0
ABDOMINAL PAIN: 0

## 2025-08-15 LAB
BUN SERPL-MCNC: 22 MG/DL (ref 6–24)
BUN/CREAT SERPL: 27 (ref 9–23)
CALCIUM SERPL-MCNC: 10 MG/DL (ref 8.7–10.2)
CHLORIDE SERPL-SCNC: 102 MMOL/L (ref 96–106)
CO2 SERPL-SCNC: 22 MMOL/L (ref 20–29)
CREAT SERPL-MCNC: 0.83 MG/DL (ref 0.57–1)
EGFRCR SERPLBLD CKD-EPI 2021: 86 ML/MIN/1.73
GLUCOSE SERPL-MCNC: 88 MG/DL (ref 70–99)
POTASSIUM SERPL-SCNC: 4.3 MMOL/L (ref 3.5–5.2)
SODIUM SERPL-SCNC: 140 MMOL/L (ref 134–144)